# Patient Record
Sex: FEMALE | Race: WHITE | NOT HISPANIC OR LATINO | Employment: FULL TIME | URBAN - METROPOLITAN AREA
[De-identification: names, ages, dates, MRNs, and addresses within clinical notes are randomized per-mention and may not be internally consistent; named-entity substitution may affect disease eponyms.]

---

## 2017-07-26 ENCOUNTER — HOSPITAL ENCOUNTER (EMERGENCY)
Facility: HOSPITAL | Age: 52
Discharge: HOME | End: 2017-07-26
Attending: EMERGENCY MEDICINE | Admitting: EMERGENCY MEDICINE
Payer: COMMERCIAL

## 2017-07-26 ENCOUNTER — APPOINTMENT (EMERGENCY)
Dept: CT IMAGING | Facility: HOSPITAL | Age: 52
End: 2017-07-26
Payer: COMMERCIAL

## 2017-07-26 VITALS
HEART RATE: 76 BPM | SYSTOLIC BLOOD PRESSURE: 117 MMHG | WEIGHT: 158.51 LBS | DIASTOLIC BLOOD PRESSURE: 67 MMHG | RESPIRATION RATE: 16 BRPM | TEMPERATURE: 97.9 F | OXYGEN SATURATION: 99 %

## 2017-07-26 DIAGNOSIS — M54.40 LUMBAGO WITH SCIATICA: Primary | ICD-10-CM

## 2017-07-26 LAB
ANION GAP SERPL CALCULATED.3IONS-SCNC: 9 MMOL/L (ref 4–13)
BACTERIA UR QL AUTO: ABNORMAL /HPF
BASOPHILS # BLD AUTO: 0.02 THOUSANDS/ΜL (ref 0–0.1)
BASOPHILS NFR BLD AUTO: 0 % (ref 0–1)
BILIRUB UR QL STRIP: NEGATIVE
BUN SERPL-MCNC: 14 MG/DL (ref 5–25)
CALCIUM SERPL-MCNC: 8.8 MG/DL (ref 8.3–10.1)
CHLORIDE SERPL-SCNC: 106 MMOL/L (ref 100–108)
CLARITY UR: CLEAR
CLARITY, POC: CLEAR
CO2 SERPL-SCNC: 28 MMOL/L (ref 21–32)
COLOR UR: YELLOW
COLOR, POC: YELLOW
CREAT SERPL-MCNC: 0.64 MG/DL (ref 0.6–1.3)
EOSINOPHIL # BLD AUTO: 0.24 THOUSAND/ΜL (ref 0–0.61)
EOSINOPHIL NFR BLD AUTO: 3 % (ref 0–6)
ERYTHROCYTE [DISTWIDTH] IN BLOOD BY AUTOMATED COUNT: 13.3 % (ref 11.6–15.1)
EXT BILIRUBIN, UA: NORMAL
EXT BLOOD URINE: NORMAL
EXT GLUCOSE, UA: NEGATIVE
EXT KETONES: NEGATIVE
EXT NITRITE, UA: NEGATIVE
EXT PH, UA: 6.5
EXT PROTEIN, UA: NORMAL
EXT SPECIFIC GRAVITY, UA: NORMAL
EXT UROBILINOGEN: 0.2
GFR SERPL CREATININE-BSD FRML MDRD: 104 ML/MIN/1.73SQ M
GLUCOSE SERPL-MCNC: 107 MG/DL (ref 65–140)
GLUCOSE UR STRIP-MCNC: NEGATIVE MG/DL
HCT VFR BLD AUTO: 36.4 % (ref 34.8–46.1)
HGB BLD-MCNC: 12.2 G/DL (ref 11.5–15.4)
HGB UR QL STRIP.AUTO: ABNORMAL
KETONES UR STRIP-MCNC: NEGATIVE MG/DL
LEUKOCYTE ESTERASE UR QL STRIP: NEGATIVE
LYMPHOCYTES # BLD AUTO: 3.05 THOUSANDS/ΜL (ref 0.6–4.47)
LYMPHOCYTES NFR BLD AUTO: 33 % (ref 14–44)
MCH RBC QN AUTO: 27.9 PG (ref 26.8–34.3)
MCHC RBC AUTO-ENTMCNC: 33.5 G/DL (ref 31.4–37.4)
MCV RBC AUTO: 83 FL (ref 82–98)
MONOCYTES # BLD AUTO: 0.71 THOUSAND/ΜL (ref 0.17–1.22)
MONOCYTES NFR BLD AUTO: 8 % (ref 4–12)
NEUTROPHILS # BLD AUTO: 5.25 THOUSANDS/ΜL (ref 1.85–7.62)
NEUTS SEG NFR BLD AUTO: 56 % (ref 43–75)
NITRITE UR QL STRIP: NEGATIVE
NON-SQ EPI CELLS URNS QL MICRO: ABNORMAL /HPF
PH UR STRIP.AUTO: 6 [PH] (ref 4.5–8)
PLATELET # BLD AUTO: 261 THOUSANDS/UL (ref 149–390)
PMV BLD AUTO: 9.6 FL (ref 8.9–12.7)
POTASSIUM SERPL-SCNC: 3.7 MMOL/L (ref 3.5–5.3)
PROT UR STRIP-MCNC: NEGATIVE MG/DL
RBC # BLD AUTO: 4.37 MILLION/UL (ref 3.81–5.12)
RBC #/AREA URNS AUTO: ABNORMAL /HPF
SODIUM SERPL-SCNC: 143 MMOL/L (ref 136–145)
SP GR UR STRIP.AUTO: 1.01 (ref 1–1.03)
UROBILINOGEN UR QL STRIP.AUTO: 0.2 E.U./DL
WBC # BLD AUTO: 9.27 THOUSAND/UL (ref 4.31–10.16)
WBC # BLD EST: NEGATIVE 10*3/UL
WBC #/AREA URNS AUTO: ABNORMAL /HPF

## 2017-07-26 PROCEDURE — 96374 THER/PROPH/DIAG INJ IV PUSH: CPT

## 2017-07-26 PROCEDURE — 81001 URINALYSIS AUTO W/SCOPE: CPT | Performed by: EMERGENCY MEDICINE

## 2017-07-26 PROCEDURE — 81002 URINALYSIS NONAUTO W/O SCOPE: CPT | Performed by: EMERGENCY MEDICINE

## 2017-07-26 PROCEDURE — 36415 COLL VENOUS BLD VENIPUNCTURE: CPT | Performed by: EMERGENCY MEDICINE

## 2017-07-26 PROCEDURE — 80048 BASIC METABOLIC PNL TOTAL CA: CPT | Performed by: EMERGENCY MEDICINE

## 2017-07-26 PROCEDURE — 99284 EMERGENCY DEPT VISIT MOD MDM: CPT

## 2017-07-26 PROCEDURE — 74176 CT ABD & PELVIS W/O CONTRAST: CPT

## 2017-07-26 PROCEDURE — 85025 COMPLETE CBC W/AUTO DIFF WBC: CPT | Performed by: EMERGENCY MEDICINE

## 2017-07-26 RX ORDER — METHYLPREDNISOLONE 4 MG/1
TABLET ORAL
Qty: 21 TABLET | Refills: 0 | Status: SHIPPED | OUTPATIENT
Start: 2017-07-26 | End: 2018-11-27

## 2017-07-26 RX ORDER — MORPHINE SULFATE 15 MG/1
15 TABLET ORAL EVERY 4 HOURS PRN
Qty: 15 TABLET | Refills: 0 | Status: SHIPPED | OUTPATIENT
Start: 2017-07-26 | End: 2018-11-27

## 2017-07-26 RX ORDER — KETOROLAC TROMETHAMINE 30 MG/ML
15 INJECTION, SOLUTION INTRAMUSCULAR; INTRAVENOUS ONCE
Status: COMPLETED | OUTPATIENT
Start: 2017-07-26 | End: 2017-07-26

## 2017-07-26 RX ADMIN — KETOROLAC TROMETHAMINE 15 MG: 30 INJECTION, SOLUTION INTRAMUSCULAR at 15:48

## 2017-11-20 ENCOUNTER — ALLSCRIPTS OFFICE VISIT (OUTPATIENT)
Dept: OTHER | Facility: OTHER | Age: 52
End: 2017-11-20

## 2017-11-20 DIAGNOSIS — E01.0 IODINE-DEFICIENCY RELATED DIFFUSE GOITER: ICD-10-CM

## 2017-11-20 DIAGNOSIS — Z01.419 ENCOUNTER FOR GYNECOLOGICAL EXAMINATION WITHOUT ABNORMAL FINDING: ICD-10-CM

## 2017-11-21 NOTE — PROGRESS NOTES
Assessment  1  Encounter for annual routine gynecological examination (V72 31) (Z01 419)    Plan   Encounter for annual routine gynecological examination    · (B) PAP WITH HPV PLUS; Status:Active; Requested for:20Nov2017;    Perform:[facility]; Due:20Nov2018; Ordered;For:Encounter for annual routine gynecological examination; Ordered By:Preethi Dawson; Thyromegaly    · (1) T4, FREE; Status:Active; Requested for:20Nov2017;    Perform:[facility]; DRD:[id number]; Ordered; For:Thyromegaly; Ordered By:Preethi Dawson;   · (1) TSH; Status:Active; Requested for:20Nov2017;    Perform:[facility]; RMM:[id number]; Ordered;Ordered By:Preethi Dawson;  * MAMMO SCREENING BILATERAL W CAD; Status:Hold For - Scheduling; Requested for:20Nov2017;  Perform:[facility]; [phone]; Ordered;   For:Encounter for annual routine gynecological examination; Ordered By:Preethi Dawson;  Genetics Counselor Referral Co-Management  *  Status: Hold For - Scheduling  Requested for: [id number] Ordered; For: FamHx: Family history of breast cancer;  Ordered By: Tati Johnston  Performed:   Due: [id number]   Discussion/Summary  health maintenance visit healthy adult female Breast cancer screening: the risks and benefits of breast cancer screening were discussed and monthly self breast exam was advised  Pt for annual GYN exam Yinka-PCPplan TFTs due to mild thyromegalyquestions sister w breast cancer and if additional testing is needed based on that  Info given genetic counselor if pt desires discussion  The patient has the current Goals: At goal  The patent has the current Barriers: None  Patient is able to Self-Care  Chief Complaint  P for annual GYN exam      History of Present Illness  HPI: Pt for annual GYN exam  Over the last year periods still coming monthly but flow is lessening  Still coming monthly  Does c/o some night sweats but tolerable  Denies vaginal d/c or GYN complaints   Was recently on abx watching for yeas sx but nothing currently  NOt currenlty sexually active  WNL in the pasthave cyst aspirated left breast 6 years ago    GYN HM, Adult Female [facility]: The patient is being seen for a health maintenance and gynecology evaluation  The last health maintenance visit was 1 year(s) ago  General Health: The patient's health since the last visit is described as good  Lifestyle:  She does not use tobacco -- She denies alcohol use  -- She denies drug use  Reproductive health: the patient is perimenopausal--   she reports no menstrual problems  -- she uses no contraception  -- she is not sexually active  -- she denies prior pregnancies  Screening:      Review of Systems  no pelvic pain,-- no pelvic pressure,-- no vaginal pain,-- no vaginal discharge,-- no vaginal itching,-- no vaginal lump or mass,-- no vaginal odor,-- no nonmenstrual bleeding,-- no dysmenorrhea,-- no menorrhagia,-- periods are regular,-- regular length of periods,-- no dysuria,-- no bladder pain,-- urine not cloudy-- and-- no urinary loss of control  Constitutional: no fever-- and-- no chills  Cardiovascular: no chest pain-- and-- no palpitations  Respiratory: no shortness of breath-- and-- no wheezing  Gastrointestinal: no abdominal pain-- and-- no constipation  Integumentary: no rashes  Surgical History    The surgical history was reviewed and updated today  Family History  Sister    · Family history of breast cancer (V16 3) (Z80 3)   · Family history of diabetes mellitus (V18 0) (Z83 3)   · Family history of factor V deficiency (V18 3) (Z83 2)    Social History   · Feels safe at home   · No alcohol use   · No history of regular tobacco use (V49 89) (Z78 9)   · Primary spoken language English    Current Meds   1  No Reported Medications Recorded    Allergies  1   No Known Drug Allergies    Vitals   Recorded: [id number] 09:20IU   Systolic 268, LUE, Sitting   Diastolic 78, LUE, Sitting   Height 5 ft 1 in   Weight 166 lb BMI Calculated 31 37   BSA Calculated 1 75   LMP 16Oct2017       Physical Exam   Constitutional  General appearance: No acute distress, well appearing and well nourished  Neck  Neck: Normal, supple, trachea midline, no masses  Thyroid: Abnormal  -- mild diffuse thyromegaly  Pulmonary  Respiratory effort: No increased work of breathing or signs of respiratory distress  Auscultation of lungs: Clear to auscultation  Cardiovascular  Auscultation of heart: Normal rate and rhythm, normal S1 and S2, no murmurs  Peripheral vascular exam: Normal pulses Throughout  Genitourinary  External genitalia: Normal and no lesions appreciated  Vagina: Normal, no lesions or dryness appreciated  Urethra: Normal    Urethral meatus: Normal    Bladder: Normal, soft, non-tender and no prolapse or masses appreciated  Cervix: Normal, no palpable masses  -- did not fully visualize entire cx due to pt discomfort during exam   Uterus: Normal, non-tender, not enlarged, and no palpable masses  Adnexa/parametria: Normal, non-tender and no fullness or masses appreciated  Anus, perineum, and rectum: Normal sphincter tone, no masses, and no prolapse  Chest  Breasts: Normal and no dimpling or skin changes noted  Abdomen  Abdomen: Normal, non-tender, and no organomegaly noted  Liver and spleen: No hepatomegaly or splenomegaly  Examination for hernias: No hernias appreciated  Stool sample for occult blood: Negative  -- heme neg  Lymphatic  Palpation of lymph nodes in neck, axillae, groin and/or other locations: No lymphadenopathy or masses noted  Skin  Skin and subcutaneous tissue: Normal skin turgor and no rashes  Palpation of skin and subcutaneous tissue: Normal    Psychiatric  Orientation to person, place, and time: Normal    Mood and affect: Normal        Attending Note  Collaborating Physician Note: Collaborating Note: I supervised the Advanced Practitioner-- and-- I agree with the Advanced Practitioner note   I discussed the case with the Advanced Practitioner and reviewed the AP note      Signatures   Electronically signed by : Ritchie oCrona, [facility]; Nov 20 2017  9:32AM EST                       (Author)    Electronically signed by : DMITRIY Javier ; Nov 20 2017  9:26PM EST                       (Author)

## 2017-11-23 ENCOUNTER — LAB CONVERSION - ENCOUNTER (OUTPATIENT)
Dept: OTHER | Facility: OTHER | Age: 52
End: 2017-11-23

## 2017-11-23 LAB
T4 FREE SERPL-MCNC: 0.9 NG/DL (ref 0.8–1.8)
TSH SERPL DL<=0.05 MIU/L-ACNC: 2.11 MIU/L

## 2017-11-26 LAB
HPV 18 (HISTORICAL): NOT DETECTED
HPV HIGH RISK 16/18 (HISTORICAL): NOT DETECTED
HPV16 (HISTORICAL): NOT DETECTED
PAP (HISTORICAL): NORMAL

## 2017-11-28 ENCOUNTER — GENERIC CONVERSION - ENCOUNTER (OUTPATIENT)
Dept: OTHER | Facility: OTHER | Age: 52
End: 2017-11-28

## 2018-01-15 VITALS
HEIGHT: 61 IN | BODY MASS INDEX: 31.34 KG/M2 | DIASTOLIC BLOOD PRESSURE: 78 MMHG | SYSTOLIC BLOOD PRESSURE: 118 MMHG | WEIGHT: 166 LBS

## 2018-11-26 PROBLEM — Z01.419 WELL WOMAN EXAM: Status: ACTIVE | Noted: 2018-11-26

## 2018-11-26 PROBLEM — E78.5 HYPERLIPEMIA: Status: ACTIVE | Noted: 2017-11-20

## 2018-11-26 PROBLEM — E01.0 THYROMEGALY: Status: ACTIVE | Noted: 2017-11-20

## 2018-11-27 ENCOUNTER — ANNUAL EXAM (OUTPATIENT)
Dept: OBGYN CLINIC | Facility: CLINIC | Age: 53
End: 2018-11-27
Payer: COMMERCIAL

## 2018-11-27 VITALS
SYSTOLIC BLOOD PRESSURE: 118 MMHG | DIASTOLIC BLOOD PRESSURE: 80 MMHG | HEIGHT: 61 IN | WEIGHT: 175 LBS | BODY MASS INDEX: 33.04 KG/M2

## 2018-11-27 DIAGNOSIS — Z01.419 WELL WOMAN EXAM: Primary | ICD-10-CM

## 2018-11-27 DIAGNOSIS — Z12.39 BREAST CANCER SCREENING: ICD-10-CM

## 2018-11-27 PROCEDURE — 99396 PREV VISIT EST AGE 40-64: CPT | Performed by: PHYSICIAN ASSISTANT

## 2018-11-27 NOTE — PATIENT INSTRUCTIONS
Pt doing well  Pap deferred today due to guidelines  Will plan mammo and colonoscopy this year  F/u 1 year, earlier as needed

## 2019-09-05 ENCOUNTER — APPOINTMENT (EMERGENCY)
Dept: RADIOLOGY | Facility: HOSPITAL | Age: 54
End: 2019-09-05
Payer: COMMERCIAL

## 2019-09-05 ENCOUNTER — HOSPITAL ENCOUNTER (EMERGENCY)
Facility: HOSPITAL | Age: 54
Discharge: HOME | End: 2019-09-05
Attending: EMERGENCY MEDICINE | Admitting: EMERGENCY MEDICINE
Payer: COMMERCIAL

## 2019-09-05 VITALS
SYSTOLIC BLOOD PRESSURE: 137 MMHG | WEIGHT: 172.4 LBS | DIASTOLIC BLOOD PRESSURE: 72 MMHG | BODY MASS INDEX: 32.57 KG/M2 | TEMPERATURE: 98 F | OXYGEN SATURATION: 97 % | HEART RATE: 75 BPM | RESPIRATION RATE: 18 BRPM

## 2019-09-05 DIAGNOSIS — T50.905A ADVERSE EFFECT OF DRUG, INITIAL ENCOUNTER: ICD-10-CM

## 2019-09-05 DIAGNOSIS — R06.00 DYSPNEA: Primary | ICD-10-CM

## 2019-09-05 LAB
ALBUMIN SERPL BCP-MCNC: 3.8 G/DL (ref 3.5–5)
ALP SERPL-CCNC: 74 U/L (ref 46–116)
ALT SERPL W P-5'-P-CCNC: 19 U/L (ref 12–78)
ANION GAP SERPL CALCULATED.3IONS-SCNC: 11 MMOL/L (ref 4–13)
APTT PPP: 30 SECONDS (ref 23–37)
AST SERPL W P-5'-P-CCNC: 27 U/L (ref 5–45)
ATRIAL RATE: 67 BPM
ATRIAL RATE: 69 BPM
B-HCG SERPL-ACNC: <2 MIU/ML
BASOPHILS # BLD AUTO: 0.07 THOUSANDS/ΜL (ref 0–0.1)
BASOPHILS NFR BLD AUTO: 1 % (ref 0–1)
BILIRUB SERPL-MCNC: 0.3 MG/DL (ref 0.2–1)
BUN SERPL-MCNC: 11 MG/DL (ref 5–25)
CALCIUM SERPL-MCNC: 9.6 MG/DL (ref 8.3–10.1)
CHLORIDE SERPL-SCNC: 102 MMOL/L (ref 100–108)
CO2 SERPL-SCNC: 26 MMOL/L (ref 21–32)
CREAT SERPL-MCNC: 0.79 MG/DL (ref 0.6–1.3)
DEPRECATED D DIMER PPP: 463 NG/ML (FEU)
EOSINOPHIL # BLD AUTO: 0.54 THOUSAND/ΜL (ref 0–0.61)
EOSINOPHIL NFR BLD AUTO: 6 % (ref 0–6)
ERYTHROCYTE [DISTWIDTH] IN BLOOD BY AUTOMATED COUNT: 13.2 % (ref 11.6–15.1)
EXT PREG TEST URINE: NEGATIVE
EXT. CONTROL ED NAV: NORMAL
GFR SERPL CREATININE-BSD FRML MDRD: 86 ML/MIN/1.73SQ M
GLUCOSE SERPL-MCNC: 89 MG/DL (ref 65–140)
HCT VFR BLD AUTO: 40.6 % (ref 34.8–46.1)
HGB BLD-MCNC: 13.4 G/DL (ref 11.5–15.4)
IMM GRANULOCYTES # BLD AUTO: 0.03 THOUSAND/UL (ref 0–0.2)
IMM GRANULOCYTES NFR BLD AUTO: 0 % (ref 0–2)
INR PPP: 0.96 (ref 0.84–1.19)
LACTATE SERPL-SCNC: 1.4 MMOL/L (ref 0.5–2)
LYMPHOCYTES # BLD AUTO: 3.68 THOUSANDS/ΜL (ref 0.6–4.47)
LYMPHOCYTES NFR BLD AUTO: 39 % (ref 14–44)
MCH RBC QN AUTO: 27.3 PG (ref 26.8–34.3)
MCHC RBC AUTO-ENTMCNC: 33 G/DL (ref 31.4–37.4)
MCV RBC AUTO: 83 FL (ref 82–98)
MONOCYTES # BLD AUTO: 0.83 THOUSAND/ΜL (ref 0.17–1.22)
MONOCYTES NFR BLD AUTO: 9 % (ref 4–12)
NEUTROPHILS # BLD AUTO: 4.32 THOUSANDS/ΜL (ref 1.85–7.62)
NEUTS SEG NFR BLD AUTO: 45 % (ref 43–75)
NRBC BLD AUTO-RTO: 0 /100 WBCS
NT-PROBNP SERPL-MCNC: 10 PG/ML
P AXIS: 50 DEGREES
P AXIS: 55 DEGREES
PLATELET # BLD AUTO: 324 THOUSANDS/UL (ref 149–390)
PMV BLD AUTO: 9.5 FL (ref 8.9–12.7)
POTASSIUM SERPL-SCNC: 3.5 MMOL/L (ref 3.5–5.3)
PR INTERVAL: 152 MS
PR INTERVAL: 154 MS
PROT SERPL-MCNC: 7.8 G/DL (ref 6.4–8.2)
PROTHROMBIN TIME: 12.2 SECONDS (ref 11.6–14.5)
QRS AXIS: 70 DEGREES
QRS AXIS: 71 DEGREES
QRSD INTERVAL: 84 MS
QRSD INTERVAL: 90 MS
QT INTERVAL: 380 MS
QT INTERVAL: 390 MS
QTC INTERVAL: 407 MS
QTC INTERVAL: 412 MS
RBC # BLD AUTO: 4.91 MILLION/UL (ref 3.81–5.12)
SODIUM SERPL-SCNC: 139 MMOL/L (ref 136–145)
T WAVE AXIS: 35 DEGREES
T WAVE AXIS: 48 DEGREES
TROPONIN I SERPL-MCNC: 0.04 NG/ML
TROPONIN I SERPL-MCNC: <0.02 NG/ML
VENTRICULAR RATE: 67 BPM
VENTRICULAR RATE: 69 BPM
WBC # BLD AUTO: 9.47 THOUSAND/UL (ref 4.31–10.16)

## 2019-09-05 PROCEDURE — 87040 BLOOD CULTURE FOR BACTERIA: CPT | Performed by: EMERGENCY MEDICINE

## 2019-09-05 PROCEDURE — 83880 ASSAY OF NATRIURETIC PEPTIDE: CPT | Performed by: EMERGENCY MEDICINE

## 2019-09-05 PROCEDURE — 85025 COMPLETE CBC W/AUTO DIFF WBC: CPT | Performed by: EMERGENCY MEDICINE

## 2019-09-05 PROCEDURE — 99285 EMERGENCY DEPT VISIT HI MDM: CPT | Performed by: EMERGENCY MEDICINE

## 2019-09-05 PROCEDURE — 85379 FIBRIN DEGRADATION QUANT: CPT | Performed by: EMERGENCY MEDICINE

## 2019-09-05 PROCEDURE — 84702 CHORIONIC GONADOTROPIN TEST: CPT | Performed by: EMERGENCY MEDICINE

## 2019-09-05 PROCEDURE — 94640 AIRWAY INHALATION TREATMENT: CPT

## 2019-09-05 PROCEDURE — 93005 ELECTROCARDIOGRAM TRACING: CPT

## 2019-09-05 PROCEDURE — 71046 X-RAY EXAM CHEST 2 VIEWS: CPT

## 2019-09-05 PROCEDURE — 85610 PROTHROMBIN TIME: CPT | Performed by: EMERGENCY MEDICINE

## 2019-09-05 PROCEDURE — 93010 ELECTROCARDIOGRAM REPORT: CPT | Performed by: INTERNAL MEDICINE

## 2019-09-05 PROCEDURE — 99284 EMERGENCY DEPT VISIT MOD MDM: CPT

## 2019-09-05 PROCEDURE — 85730 THROMBOPLASTIN TIME PARTIAL: CPT | Performed by: EMERGENCY MEDICINE

## 2019-09-05 PROCEDURE — 83605 ASSAY OF LACTIC ACID: CPT | Performed by: EMERGENCY MEDICINE

## 2019-09-05 PROCEDURE — 81025 URINE PREGNANCY TEST: CPT | Performed by: EMERGENCY MEDICINE

## 2019-09-05 PROCEDURE — 80053 COMPREHEN METABOLIC PANEL: CPT | Performed by: EMERGENCY MEDICINE

## 2019-09-05 PROCEDURE — 84484 ASSAY OF TROPONIN QUANT: CPT | Performed by: EMERGENCY MEDICINE

## 2019-09-05 PROCEDURE — 36415 COLL VENOUS BLD VENIPUNCTURE: CPT | Performed by: EMERGENCY MEDICINE

## 2019-09-05 RX ORDER — ALBUTEROL SULFATE 2.5 MG/3ML
2.5 SOLUTION RESPIRATORY (INHALATION) ONCE
Status: COMPLETED | OUTPATIENT
Start: 2019-09-05 | End: 2019-09-05

## 2019-09-05 RX ORDER — ALBUTEROL SULFATE 90 UG/1
2 AEROSOL, METERED RESPIRATORY (INHALATION) ONCE
Status: DISCONTINUED | OUTPATIENT
Start: 2019-09-05 | End: 2019-09-05 | Stop reason: HOSPADM

## 2019-09-05 RX ADMIN — ALBUTEROL SULFATE 2.5 MG: 2.5 SOLUTION RESPIRATORY (INHALATION) at 03:57

## 2019-09-05 NOTE — ED PROVIDER NOTES
History  Chief Complaint   Patient presents with    Allergic Reaction     woke at [phone] with sob, dizziness and shakey  started taking clotrimazole-bethamethsone cream yesterday     Patient is a 48year old female with sob tonight and cough and dizziness and shakiness  No travel  No chest pain  No fever  Just started clotrimazole-bethamethasone cream yesterday for tinea corporis of R forearm  States she drove here  ?throat swelling  No N/V  LMP - 3 weeks ago  Was last seen in this ED on 7/26/17 for lumbago with sciatica  [facility] on this patient and no Rx found  History provided by:  Patient   used: No    Allergic Reaction   Presenting symptoms: difficulty swallowing (possible) and rash        None       History reviewed  No pertinent past medical history  History reviewed  No pertinent surgical history  Family History   Problem Relation Age of Onset    Breast cancer Sister     Factor V Leiden deficiency Sister     Diabetes Sister      I have reviewed and agree with the history as documented  Social History     Tobacco Use    Smoking status: Never Smoker    Smokeless tobacco: Never Used   Substance Use Topics    Alcohol use: Not on file    Drug use: No        Review of Systems   Constitutional: Negative for fever  HENT: Positive for trouble swallowing (possible)  Respiratory: Positive for cough and shortness of breath  Cardiovascular: Negative for chest pain  Gastrointestinal: Negative for nausea and vomiting  Skin: Positive for rash  Neurological: Positive for dizziness  All other systems reviewed and are negative  Physical Exam  Physical Exam   Constitutional: She is oriented to person, place, and time  She appears well-developed and well-nourished  She appears distressed (moderate)  HENT:   Head: Normocephalic and atraumatic  Mouth/Throat: Oropharynx is clear and moist  No oropharyngeal exudate     Eyes: Pupils are equal, round, and reactive to light  EOM are normal  No scleral icterus  Neck: Normal range of motion  Neck supple  No JVD present  No tracheal deviation present  Cardiovascular: Normal rate, regular rhythm and normal heart sounds  No murmur heard  Pulmonary/Chest: Effort normal and breath sounds normal  No stridor  No respiratory distress  She has no wheezes  She has no rales  She exhibits no tenderness  Abdominal: Soft  Bowel sounds are normal  There is no tenderness  Musculoskeletal: She exhibits no edema or deformity  Neurological: She is alert and oriented to person, place, and time  No focal deficits  Skin: Skin is warm and dry  No rash noted  Psychiatric: She has a normal mood and affect  Nursing note and vitals reviewed        Vital Signs  ED Triage Vitals [09/05/19 0310]   Temperature Pulse Respirations Blood Pressure SpO2   98 °F (36 7 °C) 78 18 143/85 98 %      Temp Source Heart Rate Source Patient Position - Orthostatic VS BP Location FiO2 (%)   Oral Monitor Lying Right arm --      Pain Score       No Pain           Vitals:    09/05/19 0310 09/05/19 0621   BP: 143/85 137/72   Pulse: 78 75   Patient Position - Orthostatic VS: Lying Lying         Visual Acuity      ED Medications  Medications   albuterol (PROVENTIL HFA,VENTOLIN HFA) inhaler 2 puff (has no administration in time range)   albuterol inhalation solution 2 5 mg (2 5 mg Nebulization Given 9/5/19 0357)       Diagnostic Studies  Results Reviewed     Procedure Component Value Units Date/Time    Troponin I [[id number]]  (Normal) Collected:  09/05/19 0627    Lab Status:  Final result Specimen:  Blood from Arm, Right Updated:  09/05/19 0648     Troponin I <0 02 ng/mL     Quantitative hCG [[id number]]  (Normal) Collected:  09/05/19 0341    Lab Status:  Final result Specimen:  Blood from Arm, Right Updated:  09/05/19 0425     HCG, Quant <2 mIU/mL     Narrative:        Expected Ranges:     Approximate               Approximate HCG  Gestation age Concentration ( mIU/mL)  _____________          ______________________   Jason Agudelo                      HCG values  0 2-1                       5-50  1-2                           2-3                         100-5000  3-4                         500-32214  4-5                         1000-82112  5-6                         45713-303909  6-8                         48830-071641  8-12                        97446-510900      POCT pregnancy, urine [[id number]]  (Normal) Resulted:  09/05/19 0420    Lab Status:  Final result Updated:  09/05/19 0420     EXT PREG TEST UR (Ref: Negative) negative     Control valid    B-type natriuretic peptide [[id number]]  (Normal) Collected:  09/05/19 0341    Lab Status:  Final result Specimen:  Blood from Arm, Right Updated:  09/05/19 0420     NT-proBNP 10 pg/mL     Blood culture #1 [[id number]] Collected:  09/05/19 0404    Lab Status: In process Specimen:  Blood from Arm, Right Updated:  09/05/19 0412    Lactic acid, plasma [[id number]]  (Normal) Collected:  09/05/19 0341    Lab Status:  Final result Specimen:  Blood from Arm, Right Updated:  09/05/19 0409     LACTIC ACID 1 4 mmol/L     Narrative:       Result may be elevated if tourniquet was used during collection      Troponin I [[id number]]  (Normal) Collected:  09/05/19 0341    Lab Status:  Final result Specimen:  Blood from Arm, Right Updated:  09/05/19 0406     Troponin I 0 04 ng/mL     Comprehensive metabolic panel [[id number]] Collected:  09/05/19 0341    Lab Status:  Final result Specimen:  Blood from Arm, Right Updated:  09/05/19 0405     Sodium 139 mmol/L      Potassium 3 5 mmol/L      Chloride 102 mmol/L      CO2 26 mmol/L      ANION GAP 11 mmol/L      BUN 11 mg/dL      Creatinine 0 79 mg/dL      Glucose 89 mg/dL      Calcium 9 6 mg/dL      AST 27 U/L      ALT 19 U/L      Alkaline Phosphatase 74 U/L      Total Protein 7 8 g/dL      Albumin 3 8 g/dL      Total Bilirubin 0 30 mg/dL      eGFR 86 ml/min/1 73sq m     Narrative: National Kidney Disease Foundation guidelines for Chronic Kidney Disease (CKD):     Stage 1 with normal or high GFR (GFR > 90 mL/min/1 73 square meters)    Stage 2 Mild CKD (GFR = 60-89 mL/min/1 73 square meters)    Stage 3A Moderate CKD (GFR = 45-59 mL/min/1 73 square meters)    Stage 3B Moderate CKD (GFR = 30-44 mL/min/1 73 square meters)    Stage 4 Severe CKD (GFR = 15-29 mL/min/1 73 square meters)    Stage 5 End Stage CKD (GFR <15 mL/min/1 73 square meters)  Note: GFR calculation is accurate only with a steady state creatinine    D-Dimer [[id number]]  (Normal) Collected:  09/05/19 0341    Lab Status:  Final result Specimen:  Blood from Arm, Right Updated:  09/05/19 0402     D-Dimer, Quant 463 ng/ml (FEU)     Protime-INR [[id number]]  (Normal) Collected:  09/05/19 0341    Lab Status:  Final result Specimen:  Blood from Arm, Right Updated:  09/05/19 0359     Protime 12 2 seconds      INR 0 96    APTT [[id number]]  (Normal) Collected:  09/05/19 0341    Lab Status:  Final result Specimen:  Blood from Arm, Right Updated:  09/05/19 0359     PTT 30 seconds     CBC and differential [[id number]] Collected:  09/05/19 0341    Lab Status:  Final result Specimen:  Blood from Arm, Right Updated:  09/05/19 0351     WBC 9 47 Thousand/uL      RBC 4 91 Million/uL      Hemoglobin 13 4 g/dL      Hematocrit 40 6 %      MCV 83 fL      MCH 27 3 pg      MCHC 33 0 g/dL      RDW 13 2 %      MPV 9 5 fL      Platelets 499 Thousands/uL      nRBC 0 /100 WBCs      Neutrophils Relative 45 %      Immat GRANS % 0 %      Lymphocytes Relative 39 %      Monocytes Relative 9 %      Eosinophils Relative 6 %      Basophils Relative 1 %      Neutrophils Absolute 4 32 Thousands/µL      Immature Grans Absolute 0 03 Thousand/uL      Lymphocytes Absolute 3 68 Thousands/µL      Monocytes Absolute 0 83 Thousand/µL      Eosinophils Absolute 0 54 Thousand/µL      Basophils Absolute 0 07 Thousands/µL     Blood culture #2 [[id number]] Collected:  09/05/19 0341 Lab Status: In process Specimen:  Blood from Arm, Right Updated:  09/05/19 0344                 XR chest 2 views   ED Interpretation by Tino Fernandez MD (09/05 0405)   No acute disease read by me  Procedures  ECG 12 Lead Documentation Only  Date/Time: 9/5/2019 3:35 AM  Performed by: Tino Fernandez MD  Authorized by: Tino Fernandez MD     Indications / Diagnosis:  Sob  ECG reviewed by me, the ED Provider: yes    Patient location:  ED  Previous ECG:     Previous ECG:  Unavailable  Interpretation:     Interpretation: normal    Rate:     ECG rate:  69    ECG rate assessment: normal    Rhythm:     Rhythm: sinus rhythm    Ectopy:     Ectopy: none    QRS:     QRS axis:  Normal    QRS intervals:  Normal  Conduction:     Conduction: normal    ST segments:     ST segments:  Normal  T waves:     T waves: normal      ECG 12 Lead Documentation Only  Date/Time: 9/5/2019 6:26 AM  Performed by: Tino Fernandez MD  Authorized by: Tino Fenrandez MD     Indications / Diagnosis:  Repeat ekg for sob  ECG reviewed by me, the ED Provider: yes    Patient location:  ED  Previous ECG:     Previous ECG:  Compared to current    Comparison ECG info:  Earlier tonight    Similarity:  No change  Interpretation:     Interpretation: normal    Rate:     ECG rate:  67    ECG rate assessment: normal    Rhythm:     Rhythm: sinus rhythm    Ectopy:     Ectopy: none    QRS:     QRS axis:  Normal    QRS intervals:  Normal  Conduction:     Conduction: normal    ST segments:     ST segments:  Normal  T waves:     T waves: normal             ED Course  ED Course as of Sep 05 0659   Thu Sep 05, 2019   0335 EKG d/w patient        56 Labs and CXR d/w patient  Will recheck EKG and  troponin at 0630 and if okay will discharge home  6675 Repeat EKG and troponin d/w patient                                     MDM  Number of Diagnoses or Management Options  Diagnosis management comments: DDX including but not limited to: pneumonia, pleural effusion, CHF, PE, PTX, ACS, MI, asthma exacerbation, COPD exacerbation, anemia, metabolic abnormality, renal failure, adverse reaction; doubt intracranial process  Amount and/or Complexity of Data Reviewed  Clinical lab tests: ordered and reviewed  Tests in the radiology section of CPT®: ordered and reviewed  Decide to obtain previous medical records or to obtain history from someone other than the patient: yes  Review and summarize past medical records: yes  Independent visualization of images, tracings, or specimens: yes        Disposition  Final diagnoses:   Dyspnea   Adverse effect of drug, initial encounter     Time reflects when diagnosis was documented in both MDM as applicable and the Disposition within this note     Time User Action Codes Description Comment    9/5/2019  6:56 AM Suzanna Radish Add [R06 00] Dyspnea     9/5/2019  6:57 AM Suzanna Radish Add [T50 905A] Adverse effect of drug, initial encounter       ED Disposition     ED Disposition Condition Date/Time Comment    Discharge Stable u Sep 5, 2019  6:56 AM Nessa Rucker discharge to home/self care  Follow-up Information     Follow up With Specialties Details Why Contact Info    Koki Corley MD Internal Medicine Call today Stop topical cream  Return sooner if increased difficulty breathing, [address]            Patient's Medications    No medications on file     No discharge procedures on file      ED Provider  Electronically Signed by           Lamont Batista MD  09/05/19 7301

## 2019-09-10 LAB
BACTERIA BLD CULT: NORMAL
BACTERIA BLD CULT: NORMAL

## 2019-12-12 DIAGNOSIS — Z12.39 SCREENING FOR BREAST CANCER: Primary | ICD-10-CM

## 2020-02-13 ENCOUNTER — OFFICE VISIT (OUTPATIENT)
Dept: OBGYN CLINIC | Facility: CLINIC | Age: 55
End: 2020-02-13
Payer: COMMERCIAL

## 2020-02-13 ENCOUNTER — APPOINTMENT (OUTPATIENT)
Dept: RADIOLOGY | Facility: AMBULARY SURGERY CENTER | Age: 55
End: 2020-02-13
Attending: SURGERY
Payer: COMMERCIAL

## 2020-02-13 VITALS
WEIGHT: 166 LBS | DIASTOLIC BLOOD PRESSURE: 77 MMHG | HEART RATE: 77 BPM | SYSTOLIC BLOOD PRESSURE: 126 MMHG | HEIGHT: 61 IN | BODY MASS INDEX: 31.34 KG/M2

## 2020-02-13 DIAGNOSIS — R22.31 MASS OF RIGHT HAND: ICD-10-CM

## 2020-02-13 DIAGNOSIS — R20.2 NUMBNESS AND TINGLING IN RIGHT HAND: ICD-10-CM

## 2020-02-13 DIAGNOSIS — R22.31 MASS OF RIGHT HAND: Primary | ICD-10-CM

## 2020-02-13 DIAGNOSIS — R20.0 NUMBNESS AND TINGLING IN RIGHT HAND: ICD-10-CM

## 2020-02-13 PROCEDURE — 99203 OFFICE O/P NEW LOW 30 MIN: CPT | Performed by: SURGERY

## 2020-02-13 PROCEDURE — 73130 X-RAY EXAM OF HAND: CPT

## 2020-02-13 RX ORDER — METHYLPREDNISOLONE 4 MG/1
TABLET ORAL
Qty: 1 EACH | Refills: 0 | Status: SHIPPED | OUTPATIENT
Start: 2020-02-13 | End: 2020-02-20

## 2020-02-13 NOTE — PROGRESS NOTES
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  It was discussed today that the mass could also be a giant cell tumor of the tendon sheath  With regards to her numbness and tingling, she may have irritated her ulnar nerve at the wrist(Guyon's canal)  A Medrol dosepak was prescribed today and sent to her pharmacy electronically  She was advised to avoid NSAID's while on the oral steroid  OT was ordered for ulnar nerve glides  In addition in regards to her volar small ring finger mass will send her to interventional Radiology to obtain an ultrasound guided aspiration if possible and then see her back approximately 2 weeks after intervention  Should the mass not be able to be aspirated or return after aspiration will proceed with surgical excision    The patient verbalized understanding of exam findings and treatment plan  We engaged in the shared decision-making process and treatment options were discussed at length with the patient  Surgical and conservative management discussed today along with risks and benefits  Diagnoses and all orders for this visit:    Mass of right hand  -     XR hand 3+ vw right; Future  -     US guided msk procedure; Future    Numbness and tingling in right hand  -     Ambulatory referral to PT/OT hand therapy; Future    Other orders  -     ibuprofen (MOTRIN) 100 mg/5 mL suspension; Take 200 mg by mouth every 4 (four) hours as needed for mild pain        Follow Up:  No follow-ups on file      To Do Next Visit:  Re-evaluation of current issue        ____________________________________________________________________________________________________________________________________________      CHIEF COMPLAINT:  Chief Complaint   Patient presents with    Right Hand - Pain       SUBJECTIVE:  Gil Jenkins is a 48y o  year old RHD female who presents to the office today for a right ring finger mass  Simi Veronica states that a few months ago, she noticed a mass to the palmar aspect of her right ring finger  She states that the mass has grown in size  She also notes that she is now experiencing numbness and tingling to her ulnar 2 digits as well as pain radiating to her elbow  She is taking Motrin as needed for pain control  Pain/symptom timing:  Worse during the day when active  Pain/symptom context:  Worse with activites and work  Pain/symptom modifying factors:  Rest makes better, activities make worse  Pain/symptom associated signs/symptoms: none    Prior treatment   · NSAIDsYes   · Injections No   · Bracing/Orthotics No    Physical Therapy No     I have personally reviewed all the relevant PMH, PSH, SH, FH, Medications and allergies      PAST MEDICAL HISTORY:  History reviewed  No pertinent past medical history  PAST SURGICAL HISTORY:  History reviewed  No pertinent surgical history  FAMILY HISTORY:  Family History   Problem Relation Age of Onset    Breast cancer Sister     Factor V Leiden deficiency Sister     Diabetes Sister        SOCIAL HISTORY:  Social History     Tobacco Use    Smoking status: Never Smoker    Smokeless tobacco: Never Used   Substance Use Topics    Alcohol use: Not on file    Drug use: No       MEDICATIONS:    Current Outpatient Medications:     ibuprofen (MOTRIN) 100 mg/5 mL suspension, Take 200 mg by mouth every 4 (four) hours as needed for mild pain, Disp: , Rfl:     ALLERGIES:  No Known Allergies        REVIEW OF SYSTEMS:  Review of Systems   Constitutional: Negative for chills, fever and unexpected weight change  HENT: Negative for hearing loss, nosebleeds and sore throat  Eyes: Negative for pain, redness and visual disturbance  Respiratory: Negative for cough, shortness of breath and wheezing      Cardiovascular: Negative for chest pain, palpitations and leg swelling  Gastrointestinal: Negative for abdominal pain, nausea and vomiting  Endocrine: Negative for polydipsia and polyuria  Genitourinary: Negative for difficulty urinating and hematuria  Musculoskeletal: Negative for arthralgias, joint swelling and myalgias  Skin: Negative for rash and wound  Neurological: Negative for dizziness, numbness and headaches  Psychiatric/Behavioral: Negative for decreased concentration, dysphoric mood and suicidal ideas  The patient is not nervous/anxious  VITALS:  Vitals:    02/13/20 0820   BP: 126/77   Pulse: 77       LABS:  HgA1c: No results found for: HGBA1C  BMP:   Lab Results   Component Value Date    CALCIUM 9 6 09/05/2019    K 3 5 09/05/2019    CO2 26 09/05/2019     09/05/2019    BUN 11 09/05/2019    CREATININE 0 79 09/05/2019       _____________________________________________________  PHYSICAL EXAMINATION:  General: well developed and well nourished, alert, oriented times 3 and appears comfortable  Psychiatric: Normal  HEENT: Normocephalic, Atraumatic Trachea Midline, No torticollis  Pulmonary: No audible wheezing or respiratory distress   Cardiovascular: No pitting edema, 2+ radial pulse   Skin: No erythema, lacerations, fluctation, ulcerations  Neurovascular: Sensation Intact to the Median, Ulnar, Radial Nerve, Motor Intact to the Median, Ulnar, Radial Nerve and Pulses Intact  Musculoskeletal: Normal, except as noted in detailed exam and in HPI  MUSCULOSKELETAL EXAMINATION:    Right ring finger:   No erythema  No ecchymosis   No edema  Palmar mass, proximal phalanx crease     Right Ulnar Nerve Exam:  Negative intrinsic atrophy  Negative  deformity at the elbow  Full range of motion with flexion and extension of the elbow  Negative ulnar nerve compression test at the elbow  Negative tinels over the ulnar nerve at the elbow  Right Guyon's canal Exam:  Negative thenar atrophy   Negative phalen's test  Negative Guyon's compression  Positive tinels over Guyon's canal at the wrist   Opposition strength 5/5    Abduction strength 5/5       ___________________________________________________  STUDIES REVIEWED:  I have personally reviewed AP lateral and oblique radiographs of right hand which demonstrates no acute fracture dislocation mild degenerative changes throughout          PROCEDURES PERFORMED:  Procedures  No Procedures performed today    _____________________________________________________      Félix Martinez    I,:   Shanthi Toney am acting as a scribe while in the presence of the attending physician :        I,:   Adilia Hammer MD personally performed the services described in this documentation    as scribed in my presence :

## 2020-02-20 ENCOUNTER — ANNUAL EXAM (OUTPATIENT)
Dept: OBGYN CLINIC | Facility: CLINIC | Age: 55
End: 2020-02-20
Payer: COMMERCIAL

## 2020-02-20 ENCOUNTER — EVALUATION (OUTPATIENT)
Dept: OCCUPATIONAL THERAPY | Age: 55
End: 2020-02-20
Payer: COMMERCIAL

## 2020-02-20 VITALS
BODY MASS INDEX: 31.91 KG/M2 | DIASTOLIC BLOOD PRESSURE: 80 MMHG | WEIGHT: 169 LBS | SYSTOLIC BLOOD PRESSURE: 120 MMHG | HEIGHT: 61 IN

## 2020-02-20 DIAGNOSIS — Z01.419 GYNECOLOGIC EXAM NORMAL: Primary | ICD-10-CM

## 2020-02-20 DIAGNOSIS — Z12.31 ENCOUNTER FOR SCREENING MAMMOGRAM FOR MALIGNANT NEOPLASM OF BREAST: ICD-10-CM

## 2020-02-20 DIAGNOSIS — R20.0 NUMBNESS: Primary | ICD-10-CM

## 2020-02-20 PROBLEM — B35.4 TINEA CORPORIS: Status: ACTIVE | Noted: 2019-09-04

## 2020-02-20 PROCEDURE — 99396 PREV VISIT EST AGE 40-64: CPT | Performed by: PHYSICIAN ASSISTANT

## 2020-02-20 PROCEDURE — 97165 OT EVAL LOW COMPLEX 30 MIN: CPT

## 2020-02-20 NOTE — PROGRESS NOTES
OT Evaluation     Today's date: 2020  Patient name: Terrance Garcia  : 1965  MRN: [id number]  Referring provider: Celi Serrano MD  Dx:   Encounter Diagnosis     ICD-10-CM    1  Numbness R20 0                   Assessment  Assessment details: Patient presents with numbness in her right ring/small fingers  Clinical testing suggests cubital tunnel syndrome  Her sensation was intact bilaterally  Pinch strength was slightly decreased  Patient mentioned having some ergonomic issues at work  We will problem solve to help with this  I also recommend a night time elbow extension orthosis  Impairments: lacks appropriate home exercise program  Other impairment: parathesias in right ring/small fingers  Understanding of Dx/Px/POC: good   Prognosis: good    Goals  STGs 1-2visits 1) instruct in nerve gliding exercises 2) fabricate elbow orthosis 3) ergonomic education LTGs 2-4wks 1) decrease parathesias right hand  2) good management of symptoms    Plan  Patient would benefit from: skilled occupational therapy  Planned modality interventions: thermotherapy: hydrocollator packs  Planned therapy interventions: manual therapy, activity modification, behavior modification, patient education, orthotic fitting/training, stretching, therapeutic exercise and therapeutic activities  Frequency: 2x week  Duration in weeks: 4  Treatment plan discussed with: patient        Subjective Evaluation    History of Present Illness  Mechanism of injury: Onset of numbness in her right small /ring fingers approximately 2 wks ago  She states it is constant but varies in severity  She also has a cyst in the ring finger which she is going to have removed next week  Patient states that an x ray 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   She occasionally has wrist pain     Pain  Current pain ratin  At best pain ratin  At worst pain ratin  Progression: no change    Hand dominance: right    Patient Goals  Patient goal: decrease finger numbness        Objective     Tenderness     Additional Tenderness Details  Tender over guyon's canal with parathesias into ring/small fingers  Active Range of Motion     Left Wrist   Wrist flexion: 46 degrees   Wrist extension: 50 degrees     Right Wrist   Wrist flexion: 50 degrees   Wrist extension: 50 degrees     Right Thumb   Opposition: Thumb ROM intact    Strength/Myotome Testing     Left Wrist/Hand   Normal wrist strength     (2nd hand position)     Trial 1: 35    Trial 2: 15    Trial 3: 15    Thumb Strength  Key/Lateral Pinch     Trail 1: 10  Tip/Two-Point Pinch     Trial 1: 4  Palmar/Three-Point Pinch     Trial 1: 6    Right Wrist/Hand   Normal wrist strength     (2nd hand position)     Trial 1: 15    Trial 2: 15    Trial 3: 15    Thumb Strength   Key/Lateral Pinch     Trial 1: 6  Tip/Two-Point Pinch     Trial 1: 4  Palmar/Three-Point Pinch     Trial 1: 6    Tests     Right Elbow   Positive Tinel's sign (cubital tunnel)  Left Wrist/Hand   Negative Phalen's sign and Tinel's sign (medial nerve)  Right Wrist/Hand   Negative Phalen's sign and Tinel's sign (medial nerve)  Additional Tests Details  (+) elbow flexion test on right     Slightly (+) sign on left with elbow flexion test     General Comments:      Wrist/Hand Comments  Monofilament test:    Normal bilaterally             Precautions: universal      Manual              Median/ulnar nerve gliding                                                                     Exercise Diary              Forearm flexibility exercises             Ergonomic edu             Activity modification                                                                                                                                                                                                                                              Modalities

## 2020-02-20 NOTE — PROGRESS NOTES
Assessment/Plan   Problem List Items Addressed This Visit        Other    Gynecologic exam normal - Primary     Pap guidelines reviewed  Pap deferred secondary to negative Pap in HPV in 2017  Reviewed insomnia and irritability prior to menses  Patient reports tolerable at this time  Reviewed option of low-dose SSRI, melatonin, following primary care doctor to discuss other sleep aids  Patient will continue to monitor and call office if desires other methods  Return to office for annual or as needed  Other Visit Diagnoses     Encounter for screening mammogram for malignant neoplasm of breast        Relevant Orders    Mammo screening bilateral w 3d & cad          Subjective:     Patient ID: Zoe Jc is a 48 y o  y o  female  HPI  49 yo seen for annual exam  Menses still regular but have been shorter and lighter over the last year  Reports hot flashes, mostly at night, occasional, tolerates ok  Patient reports 1 week prior to menses has insomnia and irritability  Patient's sister has ER positive breast cancer, patient is hesitant to use any HRT  Last pap: 11/20/2017 NILM (-)HRHPV  The following portions of the patient's history were reviewed and updated as appropriate:   She  has a past medical history of Hyperlipidemia  She   Patient Active Problem List    Diagnosis Date Noted    Gynecologic exam normal 02/21/2020    Tinea corporis 09/04/2019    Well woman exam 11/26/2018    Hyperlipemia 11/20/2017    Thyromegaly 11/20/2017     She  has no past surgical history on file  Her family history includes Breast cancer in her sister; Diabetes in her sister; Factor V Leiden deficiency in her sister  She  reports that she has never smoked  She has never used smokeless tobacco  She reports that she drank alcohol  She reports that she does not use drugs  No current outpatient medications on file  No current facility-administered medications for this visit        She has No Known Allergies       Menstrual History:  OB History        0    Para   0    Term   0       0    AB   0    Living   0       SAB   0    TAB   0    Ectopic   0    Multiple   0    Live Births   0                Menarche age: 10  Patient's last menstrual period was 2020 (approximate)  Period Cycle (Days): 30  Period Duration (Days): 5  Period Pattern: Regular  Menstrual Flow: Light  Dysmenorrhea: None      Review of Systems   Constitutional: Negative for fatigue, fever and unexpected weight change  HENT: Negative for dental problem and sinus pressure  Eyes: Negative for visual disturbance  Respiratory: Negative for cough, shortness of breath and wheezing  Cardiovascular: Negative for chest pain  Gastrointestinal: Negative for abdominal pain, blood in stool, constipation, diarrhea, nausea and vomiting  Endocrine: Negative for polydipsia  Genitourinary: Negative for difficulty urinating, dyspareunia, dysuria, frequency, hematuria, pelvic pain and urgency  Musculoskeletal: Negative for arthralgias and back pain  Neurological: Negative for dizziness, seizures, light-headedness and headaches  Psychiatric/Behavioral: Negative for suicidal ideas  The patient is not nervous/anxious  Objective:  Vitals:    20 1013   BP: 120/80   BP Location: Left arm   Patient Position: Sitting   Cuff Size: Standard   Weight: 76 7 kg (169 lb)   Height: 5' 1" (1 549 m)      Physical Exam   Constitutional: She is oriented to person, place, and time  She appears well-developed and well-nourished  Genitourinary: Rectum normal, vagina normal and uterus normal  Pelvic exam was performed with patient supine  There is no rash, tenderness, lesion, injury or Bartholin's cyst on the right labia  There is no rash, tenderness, lesion, injury or Bartholin's cyst on the left labia  Vagina exhibits no lesion  No erythema, tenderness or bleeding in the vagina  No signs of injury around the vagina   No vaginal discharge found  Right adnexum does not display mass, does not display tenderness and does not display fullness  Left adnexum does not display mass, does not display tenderness and does not display fullness  Cervix does not exhibit motion tenderness, lesion or discharge  Uterus is not enlarged, tender, exhibiting a mass, irregular (is regular) or mobile  Rectal exam shows no external hemorrhoid, no internal hemorrhoid, no fissure, no mass, no tenderness, anal tone normal and guaiac negative stool  HENT:   Head: Normocephalic and atraumatic  Neck: No thyromegaly present  Cardiovascular: Normal rate, regular rhythm and normal heart sounds  Exam reveals no gallop and no friction rub  No murmur heard  Pulmonary/Chest: Effort normal and breath sounds normal  No respiratory distress  She has no wheezes  Right breast exhibits no inverted nipple, no mass, no nipple discharge, no skin change and no tenderness  Left breast exhibits no inverted nipple, no mass, no nipple discharge, no skin change and no tenderness  No breast swelling, tenderness, discharge or bleeding  Breasts are symmetrical    Abdominal: Soft  She exhibits no distension and no mass  There is no tenderness  There is no rebound and no guarding  No hernia  Lymphadenopathy:     She has no cervical adenopathy  Right: No inguinal adenopathy present  Left: No inguinal adenopathy present  Neurological: She is alert and oriented to person, place, and time  Skin: Skin is warm and dry  Psychiatric: She has a normal mood and affect   Her behavior is normal

## 2020-02-21 PROBLEM — Z01.419 GYNECOLOGIC EXAM NORMAL: Status: ACTIVE | Noted: 2020-02-21

## 2020-02-22 NOTE — ASSESSMENT & PLAN NOTE
Pap guidelines reviewed  Pap deferred secondary to negative Pap in HPV in 2017  Reviewed insomnia and irritability prior to menses  Patient reports tolerable at this time  Reviewed option of low-dose SSRI, melatonin, following primary care doctor to discuss other sleep aids  Patient will continue to monitor and call office if desires other methods  Return to office for annual or as needed

## 2020-02-25 ENCOUNTER — HOSPITAL ENCOUNTER (OUTPATIENT)
Dept: RADIOLOGY | Facility: HOSPITAL | Age: 55
Discharge: HOME | End: 2020-02-25
Attending: SURGERY
Payer: COMMERCIAL

## 2020-02-25 DIAGNOSIS — R22.31 MASS OF RIGHT HAND: ICD-10-CM

## 2020-02-25 PROCEDURE — 20604 DRAIN/INJ JOINT/BURSA W/US: CPT

## 2020-02-25 RX ORDER — LIDOCAINE HYDROCHLORIDE 10 MG/ML
1 INJECTION, SOLUTION EPIDURAL; INFILTRATION; INTRACAUDAL; PERINEURAL ONCE
Status: COMPLETED | OUTPATIENT
Start: 2020-02-25 | End: 2020-02-25

## 2020-02-25 RX ADMIN — LIDOCAINE HYDROCHLORIDE 1 ML: 10 INJECTION, SOLUTION EPIDURAL; INFILTRATION; INTRACAUDAL; PERINEURAL at 09:15

## 2020-02-27 ENCOUNTER — APPOINTMENT (OUTPATIENT)
Dept: OCCUPATIONAL THERAPY | Age: 55
End: 2020-02-27
Payer: COMMERCIAL

## 2021-02-22 NOTE — PROGRESS NOTES
Assessment/Plan   Problem List Items Addressed This Visit        Other    Gynecologic exam normal - Primary     Pap guidelines reviewed  Pap deferred secondary to negative pap and HPV in 2017 in this low risk patient  Continue to monitor menses  1 year no menses equals menopause  If has bleeding after that year will need to call office for work up for postmenopausal bleeding  Script for mammogram for next December given  Return to office for annual or as needed  Other Visit Diagnoses     Encounter for screening mammogram for malignant neoplasm of breast        Relevant Orders    Mammo screening bilateral w 3d & cad          Subjective:     Patient ID: Ingris Rico is a 50 y o  y o  female  HPI  51 yo seen for annual exam  Since Oct, Nov 2020 of last year still monthly and last about 5 days but much lighter  Denies bowel or bladder issues  Starting to get some hot flashes at night  Last pap: 11/20/2017 NILM (-)HRHPV  Last [facility]: 12/2020 [facility] will request records  Last colonoscopy: 3/7/2019 good for 5 years  The following portions of the patient's history were reviewed and updated as appropriate:   She  has a past medical history of Hyperlipidemia  She   Patient Active Problem List    Diagnosis Date Noted    Gynecologic exam normal 02/21/2020    Tinea corporis 09/04/2019    Well woman exam 11/26/2018    Hyperlipemia 11/20/2017    Thyromegaly 11/20/2017     She  has a past surgical history that includes US guided msk procedure (2/25/2020)  Her family history includes Breast cancer in her sister; Diabetes in her sister; Factor V Leiden deficiency in her sister  She  reports that she has never smoked  She has never used smokeless tobacco  She reports previous alcohol use  She reports that she does not use drugs    Current Outpatient Medications   Medication Sig Dispense Refill    Calcium Carb-Cholecalciferol (CALCIUM 500+D3 PO) Take by mouth      multivitamin (THERAGRAN) TABS Take 1 tablet by mouth daily       No current facility-administered medications for this visit  She has No Known Allergies       Menstrual History:  OB History        0    Para   0    Term   0       0    AB   0    Living   0       SAB   0    TAB   0    Ectopic   0    Multiple   0    Live Births   0                  Patient's last menstrual period was 02/10/2021 (exact date)  Review of Systems   Constitutional: Negative for fatigue, fever and unexpected weight change  HENT: Negative for dental problem and sinus pressure  Eyes: Negative for visual disturbance  Respiratory: Negative for cough, shortness of breath and wheezing  Cardiovascular: Negative for chest pain  Gastrointestinal: Negative for abdominal pain, blood in stool, constipation, diarrhea, nausea and vomiting  Endocrine: Negative for polydipsia  Genitourinary: Negative for difficulty urinating, dyspareunia, dysuria, frequency, hematuria, pelvic pain and urgency  Musculoskeletal: Negative for arthralgias and back pain  Neurological: Negative for dizziness, seizures, light-headedness and headaches  Psychiatric/Behavioral: Negative for suicidal ideas  The patient is not nervous/anxious  Objective:  Vitals:    21 1325   BP: 122/80   BP Location: Left arm   Patient Position: Sitting   Cuff Size: Standard   Weight: 78 5 kg (173 lb)   Height: 5' 2" (1 575 m)      Physical Exam  Constitutional:       Appearance: Normal appearance  She is well-developed  Genitourinary:      Pelvic exam was performed with patient supine  Vulva, urethra, bladder, vagina, uterus and rectum normal       No vulval condylomata, lesion, tenderness, ulcerations, Bartholin's cyst or rash noted  No signs of labial injury  No labial fusion  No inguinal adenopathy present in the right or left side       No urethral prolapse, pain, swelling, tenderness, caruncle, mass or diverticulum present  Bladder is not distended or tender  No signs of injury or lesions in the vagina  No vaginal discharge, erythema, tenderness or bleeding  No cervical motion tenderness, discharge or lesion  Uterus is not enlarged, tender, irregular or mobile  No uterine mass detected  No right or left adnexal mass present  Right adnexa not tender or full  Left adnexa not tender or full  Rectum:      Guaiac result negative  No rectal mass, anal fissure, tenderness, external hemorrhoid, internal hemorrhoid or abnormal anal tone  HENT:      Head: Normocephalic and atraumatic  Neck:      Thyroid: No thyromegaly  Cardiovascular:      Rate and Rhythm: Normal rate and regular rhythm  Heart sounds: Normal heart sounds  No murmur  No friction rub  No gallop  Pulmonary:      Effort: Pulmonary effort is normal  No respiratory distress  Breath sounds: Normal breath sounds  No wheezing  Chest:      Breasts: Breasts are symmetrical          Right: Normal  No swelling, bleeding, inverted nipple, mass, nipple discharge, skin change or tenderness  Left: Normal  No swelling, bleeding, inverted nipple, mass, nipple discharge, skin change or tenderness  Abdominal:      General: There is no distension  Palpations: Abdomen is soft  There is no mass  Tenderness: There is no abdominal tenderness  There is no guarding or rebound  Hernia: No hernia is present  Lymphadenopathy:      Cervical: No cervical adenopathy  Upper Body:      Right upper body: No supraclavicular, axillary or pectoral adenopathy  Left upper body: No supraclavicular, axillary or pectoral adenopathy  Lower Body: No right inguinal adenopathy  No left inguinal adenopathy  Neurological:      Mental Status: She is alert and oriented to person, place, and time  Skin:     General: Skin is warm and dry     Psychiatric:         Behavior: Behavior normal

## 2021-02-23 ENCOUNTER — ANNUAL EXAM (OUTPATIENT)
Dept: OBGYN CLINIC | Facility: CLINIC | Age: 56
End: 2021-02-23
Payer: COMMERCIAL

## 2021-02-23 VITALS
DIASTOLIC BLOOD PRESSURE: 80 MMHG | WEIGHT: 173 LBS | SYSTOLIC BLOOD PRESSURE: 122 MMHG | HEIGHT: 62 IN | BODY MASS INDEX: 31.83 KG/M2

## 2021-02-23 DIAGNOSIS — Z01.419 GYNECOLOGIC EXAM NORMAL: Primary | ICD-10-CM

## 2021-02-23 DIAGNOSIS — Z12.31 ENCOUNTER FOR SCREENING MAMMOGRAM FOR MALIGNANT NEOPLASM OF BREAST: ICD-10-CM

## 2021-02-23 PROCEDURE — 99396 PREV VISIT EST AGE 40-64: CPT | Performed by: PHYSICIAN ASSISTANT

## 2021-02-23 RX ORDER — DIPHENOXYLATE HYDROCHLORIDE AND ATROPINE SULFATE 2.5; .025 MG/1; MG/1
1 TABLET ORAL DAILY
COMMUNITY

## 2021-02-23 NOTE — ASSESSMENT & PLAN NOTE
Pap guidelines reviewed  Pap deferred secondary to negative pap and HPV in 2017 in this low risk patient  Continue to monitor menses  1 year no menses equals menopause  If has bleeding after that year will need to call office for work up for postmenopausal bleeding  Script for mammogram for next December given  Return to office for annual or as needed

## 2021-02-24 ENCOUNTER — TELEPHONE (OUTPATIENT)
Dept: ADMINISTRATIVE | Facility: OTHER | Age: 56
End: 2021-02-24

## 2021-02-24 NOTE — TELEPHONE ENCOUNTER
Upon review of the In Basket request and the patient's chart, initial outreach has been made via fax, please see Contacts section for details       Thank you  Ezequiel Palacio MA

## 2021-02-24 NOTE — TELEPHONE ENCOUNTER
Upon review of the In Basket request we were able to locate, review, and update the patient chart as requested for Mammogram     Any additional questions or concerns should be emailed to the Practice Liaisons via [email or web address] email, please do not reply via In Basket      Thank you  Siomara Gonzalez MA

## 2021-02-24 NOTE — TELEPHONE ENCOUNTER
----- Message from Isabella Irby PA-C sent at 2/23/2021  1:42 PM EST -----  Patient had mammogram done 12/2020 at [facility]

## 2022-06-20 NOTE — PROGRESS NOTES
Assessment/Plan   Diagnoses and all orders for this visit:    Well woman exam        Discussion    All questions have been answered to her satisfaction  RTO for APE or sooner if needed      Subjective     Periods still coming monthly  Some mths the flow is heavier but tolerable  Does not complain of significant hot flashes they do happen occasionally a few days before her cycle  Pt has a sister w ER + breast cancer, she is hesitant to use any HRT in the future  I advised pt dietary and lifestyle habits that can worse menopausal sx and things to avoid  Reid Sam is a 48 y o  female who presents for annual well woman exam    LMP -10/16/18 ; Denies  bleeding  No vulvar itch/burn; No vaginal itch/burn; No abn discharge or odor; No urinary sx - burning/pain/frequency/hematuria  (+) SBEs - no breast masses, asymmetry, nipple discharge or bleeding, changes in skin of breast, or breast tenderness bilaterally  No abd/pelvic pain or HAs; No menopausal symptoms: No hot flashes/night sweats, problems with intercourse, vaginal dryness; sleeping well  Pt is not sexually active  She declines std/hiv/hep testing; Feels safe at home  Condom use:  (+) PCP for routine Bw/care-Dr Brigid Barnett    Last Pap - 11/20/17 WNL  History of abnormal Pap smear: none  Last mammo - 12/13/17 WNL  History of abnormal mammogram: none  Last colonoscopy - has it scheduled early 2019    Review of Systems   Constitutional: Negative for fatigue, fever and unexpected weight change  HENT: Negative for dental problem, mouth sores, nosebleeds, rhinorrhea, sinus pain, sinus pressure and sore throat  Eyes: Negative for pain, discharge and visual disturbance  Respiratory: Negative for cough, chest tightness, shortness of breath and wheezing  Cardiovascular: Negative for chest pain, palpitations and leg swelling  Gastrointestinal: Negative for blood in stool, constipation, diarrhea, nausea and vomiting     Endocrine: Negative for polydipsia  Genitourinary: Negative for difficulty urinating, dyspareunia, dysuria, menstrual problem, pelvic pain, urgency, vaginal discharge and vaginal pain  Musculoskeletal: Negative for arthralgias, back pain and joint swelling  Allergic/Immunologic: Negative for environmental allergies  Neurological: Negative for seizures, light-headedness and headaches  Hematological: Does not bruise/bleed easily  Psychiatric/Behavioral: Negative for sleep disturbance  The patient is not nervous/anxious  The following portions of the patient's history were reviewed and updated as appropriate: allergies, current medications, past family history, past medical history, past social history, past surgical history and problem list          OB History     No data available          No past medical history on file  No past surgical history on file  Family History   Problem Relation Age of Onset    Breast cancer Sister     Factor V Leiden deficiency Sister     Diabetes Sister        Social History     Social History    Marital status: Single     Spouse name: N/A    Number of children: N/A    Years of education: N/A     Occupational History    Not on file       Social History Main Topics    Smoking status: Never Smoker    Smokeless tobacco: Not on file    Alcohol use No    Drug use: No    Sexual activity: Not on file     Other Topics Concern    Not on file     Social History Narrative    No narrative on file         Current Outpatient Prescriptions:     methylprednisolone (MEDROL) 4 mg tablet, Dosepack (follow instructions on packaging) All doses PO, 6 tabs/day1, 5 tabs/day 2, 4 tabs/day3, 3 tabs/day4, 2 tabs/day5, 1 tab/day6, Disp: 21 tablet, Rfl: 0    morphine (MSIR) 15 mg tablet, Take 1 tablet by mouth every 4 (four) hours as needed for severe pain for up to 15 doses Max Daily Amount: 90 mg, Disp: 15 tablet, Rfl: 0    No Known Allergies    Objective   There were no vitals filed for this visit   Physical Exam   Constitutional: She is oriented to person, place, and time  She appears well-developed and well-nourished  HENT:   Head: Normocephalic and atraumatic  Cardiovascular: Normal rate and regular rhythm  Pulmonary/Chest: Effort normal and breath sounds normal  Right breast exhibits no inverted nipple, no mass, no nipple discharge, no skin change and no tenderness  Left breast exhibits no inverted nipple, no mass, no nipple discharge, no skin change and no tenderness  Breasts are symmetrical    Abdominal: Soft  She exhibits no distension and no mass  There is no rebound and no guarding  Genitourinary: Vagina normal and uterus normal  Rectal exam shows guaiac negative stool  Neurological: She is alert and oriented to person, place, and time  Skin: Skin is warm and dry  Psychiatric: She has a normal mood and affect  There are no Patient Instructions on file for this visit  <-- Click to add NO significant Past Surgical History

## 2022-09-15 ENCOUNTER — ANNUAL EXAM (OUTPATIENT)
Dept: OBGYN CLINIC | Facility: CLINIC | Age: 57
End: 2022-09-15
Payer: COMMERCIAL

## 2022-09-15 VITALS
WEIGHT: 170 LBS | DIASTOLIC BLOOD PRESSURE: 80 MMHG | SYSTOLIC BLOOD PRESSURE: 120 MMHG | BODY MASS INDEX: 32.1 KG/M2 | HEIGHT: 61 IN

## 2022-09-15 DIAGNOSIS — Z01.419 WOMEN'S ANNUAL ROUTINE GYNECOLOGICAL EXAMINATION: Primary | ICD-10-CM

## 2022-09-15 DIAGNOSIS — N95.0 POSTMENOPAUSAL BLEEDING: ICD-10-CM

## 2022-09-15 PROCEDURE — 99396 PREV VISIT EST AGE 40-64: CPT | Performed by: STUDENT IN AN ORGANIZED HEALTH CARE EDUCATION/TRAINING PROGRAM

## 2022-09-15 PROCEDURE — 0503F POSTPARTUM CARE VISIT: CPT | Performed by: STUDENT IN AN ORGANIZED HEALTH CARE EDUCATION/TRAINING PROGRAM

## 2022-09-15 NOTE — PROGRESS NOTES
[facility]  Well Woman Annual Exam  Sebastian    Assessment   Indira Miller "Denice Reddy" is a 56 y o  postmenopausal female who is presenting for annual exam     Plan   · The patient previously had normal pap in 2017, pap with HPV performed today  The current ASCCP guidelines were reviewed  The low risk patient will receive pap smear screening every 3 years or pap with HPV co-testing every 5 years  · The patient declines STD testing  · Last Mammogram: 2021, mammogram ordered today  Reviewed ACOG recommendations of yearly mammogram for breast cancer screening   · Last Colonoscopy 2019, Patient due next year  · I reviewed the patients risk factors for osteoporosis  · I emphasized the importance of an annual pelvic and breast exam    · I have discussed the importance of monthly self-breast exams, exercise and healthy diet as well as adequate intake of calcium and vitamin D   · Reviewed single bleeding episode after 6 months of amenorrhea  Given associated symptoms sounds like she is menopausal and bleeding is abnormal and associated with increased risk of endometrial hyperplasia or carcinoma (abnormal growth), should be monitored/surveilled  Can (1) return for endobx, (2) ultrasound now and if abnormal endobx, if normal continue to monitor, or (3) continue to monitor if another episode of bleeding NEEDS ENDOMETRIAL SAMPLING  Will consider options  · All questions answered to the best of my ability       __________________________________________________________________      Sasha Loza is a 56 y o  postmenopausal female presenting for annual exam  She is without complaint      GYN  Complaints:  Last November was last normal period, and then in July had 5 days  Prior to November had been having menses every month, lasting for 5 days  Had been having night sweats, hot flashes, tapered off  Now occasional, but not too bothersome  Lots of insomnia, controlled with melatonin but only uses when doesn't have to work (10mg tabs), motrin with sleep aid but also leaves her groggy the next day  Still working--, very busy    Not working out on a regular basis  Gained some weight in the last few years  Drinks rarely, only when goes out to dinner (5x a year)  Lots of caffeine, but in morning  Nighttime working and eating dinner late at night    Minimal sugar, well-balanced diet    Sexually active: No  Hx STI: denies   Hx Abnormal pap: denies  Last pap: 2017    OB         Complaints: occasional ERLINDA sometimes with sneezing (sometimes using liner) not bothersome enough to speak with Urologist or pelvic floor PT  Denies hematuria and dysuria    BREAST  Complaints: denies  Denies: breast lump, nipple discharge and skin color change  Last mammogram: 2021  Personal hx: no breast biopsy  Family hx: sister diagnosed with breast cancer, had surgery, doing well, neg BRCA, not hereditary  denies fhx of uterine, ovarian, or colon cancers  Patient does do regular self-exams    GENERAL  PMH reviewed/updated and is as below  Patient does follow with a PCP  Works as a [employer]   Denies domestic violence  Exercise: not much, working on increasing  Diet: well-balanced    SCREENING  Cervical Ca: Last pap  and results were normal  Breast Ca: Last mammogram 2021 and results were normal  Colon Ca:  Last colonoscopy , going every 5 years, will return next year    Metabolic: due for physical going in a couple weeks    Past Medical History:   Diagnosis Date    Hyperlipidemia        Past Surgical History:   Procedure Laterality Date    US GUIDED MSK PROCEDURE  2020         Current Outpatient Medications:     Calcium Carb-Cholecalciferol (CALCIUM 500+D3 PO), Take by mouth, Disp: , Rfl:     fexofenadine-pseudoephedrine (ALLEGRA-D)  MG per tablet, Take 1 tablet by mouth, Disp: , Rfl:     ibuprofen (MOTRIN) 200 mg tablet, Take by mouth every 6 (six) hours as needed for mild pain, Disp: , Rfl:    multivitamin (THERAGRAN) TABS, Take 1 tablet by mouth daily, Disp: , Rfl:     ipratropium (ATROVENT) 0 03 % nasal spray, 2 sprays into each nostril every 12 (twelve) hours, Disp: 30 mL, Rfl: 5    No Known Allergies    Social History     Socioeconomic History    Marital status: Single     Spouse name: Not on file    Number of children: Not on file    Years of education: Not on file    Highest education level: Not on file   Occupational History    Not on file   Tobacco Use    Smoking status: Never Smoker    Smokeless tobacco: Never Used   Vaping Use    Vaping Use: Never used   Substance and Sexual Activity    Alcohol use: Not Currently    Drug use: No    Sexual activity: Not Currently   Other Topics Concern    Not on file   Social History Narrative    Not on file     Social Determinants of Health     Financial Resource Strain: Not on file   Food Insecurity: Not on file   Transportation Needs: Not on file   Physical Activity: Not on file   Stress: Not on file   Social Connections: Not on file   Intimate Partner Violence: Not on file   Housing Stability: Not on file     Lives alone, but lives near sister and her , nephew  Not currently in a relationship       Review of Systems  Review of Systems   Constitutional: Negative for unexpected weight change  Respiratory: Negative for shortness of breath  Cardiovascular: Negative for chest pain  Gastrointestinal: Negative for abdominal pain  Genitourinary: Negative for pelvic pain  Objective  /80 (BP Location: Left arm, Patient Position: Sitting, Cuff Size: Standard)   Ht 5' 1" (1 549 m)   Wt 77 1 kg (170 lb)   LMP 07/17/2022   BMI 32 12 kg/m²      Physical Exam:  Physical Exam  Constitutional:       Appearance: Normal appearance  She is normal weight  HENT:      Head: Normocephalic  Eyes:      Extraocular Movements: Extraocular movements intact        Conjunctiva/sclera: Conjunctivae normal    Neck:      Comments: No thyromegaly or palpable thyroid nodules  Cardiovascular:      Rate and Rhythm: Normal rate and regular rhythm  Heart sounds: Normal heart sounds  Pulmonary:      Effort: Pulmonary effort is normal       Breath sounds: Normal breath sounds  Abdominal:      General: There is no distension  Palpations: Abdomen is soft  Tenderness: There is no abdominal tenderness  There is no guarding  Genitourinary:     Comments: Vulva: normal, no lesions  Vagina: normal, no lesions or ttp  Urethra: normal, no lesions, masses or ttp  Bladder: normal, no masses or ttp  Cervix: normal, no lesions, masses or CMT  Uterus: normal-size  Adnexa: no masses or ttp    Musculoskeletal:         General: Normal range of motion  Cervical back: Normal range of motion and neck supple  No tenderness  Lymphadenopathy:      Cervical: No cervical adenopathy  Skin:     General: Skin is warm and dry  Neurological:      General: No focal deficit present  Psychiatric:         Mood and Affect: Mood normal          Behavior: Behavior normal          Thought Content:  Thought content normal        Breast inspection negative, no nipple discharge or bleeding, no masses or nodularity palpable  Rectal negative, stool guaiac negative

## 2022-09-19 LAB
CLINICAL INFO: NORMAL
CYTO CVX: NORMAL
DATE PREVIOUS BX: NORMAL
HPV E6+E7 MRNA CVX QL NAA+PROBE: NOT DETECTED
LMP START DATE: NORMAL
SL AMB PREV. PAP:: NORMAL
SPECIMEN SOURCE CVX/VAG CYTO: NORMAL

## 2023-07-15 NOTE — LETTER
Procedure Request Form: Mammogram      Date Requested: 21  Patient: Orlando Serge  Patient : 1965   Referring Provider: Inez Martin, MD        Date of Procedure ______________________________       The above patient has informed us that they have completed their   most recent Mammogram at your facility  Please complete   this form and attach all corresponding procedure reports/results  Comments __________________________________________________________  ____________________________________________________________________  ____________________________________________________________________  ____________________________________________________________________    Facility Completing Procedure _________________________________________    Form Completed By (print name) _______________________________________      Signature __________________________________________________________      These reports are needed for  compliance    Please fax this completed form and a copy of the procedure report to our office located at [place] [address] as soon as possible to [phone]: Phone [phone]    We thank you for your assistance in treating our mutual patient Cimzia Counseling:  I discussed with the patient the risks of Cimzia including but not limited to immunosuppression, allergic reactions and infections.  The patient understands that monitoring is required including a PPD at baseline and must alert us or the primary physician if symptoms of infection or other concerning signs are noted.

## 2023-12-21 ENCOUNTER — TELEPHONE (OUTPATIENT)
Dept: OBGYN CLINIC | Facility: CLINIC | Age: 58
End: 2023-12-21

## 2023-12-21 DIAGNOSIS — Z12.31 ENCOUNTER FOR SCREENING MAMMOGRAM FOR MALIGNANT NEOPLASM OF BREAST: Primary | ICD-10-CM

## 2023-12-21 NOTE — TELEPHONE ENCOUNTER
Noted last completed b/l screening mammogram 12/15/22 with recommendations for annual screening f/u noted. Order placed in chart.

## 2024-02-21 PROBLEM — Z01.419 GYNECOLOGIC EXAM NORMAL: Status: RESOLVED | Noted: 2020-02-21 | Resolved: 2024-02-21

## 2024-02-29 ENCOUNTER — OFFICE VISIT (OUTPATIENT)
Dept: URGENT CARE | Age: 59
End: 2024-02-29
Payer: COMMERCIAL

## 2024-02-29 VITALS
DIASTOLIC BLOOD PRESSURE: 99 MMHG | SYSTOLIC BLOOD PRESSURE: 146 MMHG | HEART RATE: 84 BPM | OXYGEN SATURATION: 99 % | TEMPERATURE: 96.9 F | RESPIRATION RATE: 20 BRPM

## 2024-02-29 DIAGNOSIS — R68.89 FLU-LIKE SYMPTOMS: Primary | ICD-10-CM

## 2024-02-29 PROCEDURE — G0382 LEV 3 HOSP TYPE B ED VISIT: HCPCS | Performed by: NURSE PRACTITIONER

## 2024-02-29 PROCEDURE — 87636 SARSCOV2 & INF A&B AMP PRB: CPT | Performed by: NURSE PRACTITIONER

## 2024-02-29 RX ORDER — BROMPHENIRAMINE MALEATE, PSEUDOEPHEDRINE HYDROCHLORIDE, AND DEXTROMETHORPHAN HYDROBROMIDE 2; 30; 10 MG/5ML; MG/5ML; MG/5ML
10 SYRUP ORAL 3 TIMES DAILY PRN
Qty: 150 ML | Refills: 0 | Status: SHIPPED | OUTPATIENT
Start: 2024-02-29

## 2024-02-29 NOTE — PROGRESS NOTES
[facility]        NAME: Tami Rucker is a 58 y.o. female  : 1965    MRN: [id number]  DATE: 2024  TIME: 4:06 PM    Assessment and Plan   Flu-like symptoms [R68.89]  1. Flu-like symptoms  brompheniramine-pseudoephedrine-DM 30-2-10 MG/5ML syrup    Cov/Flu - Lab Collect            Patient Instructions     Flu/PCR tested; results in 1-2 days  Take med as prescribed   Follow up with PCP in 3-5 days.  Proceed to  ER if symptoms worsen.    If tests have been performed at [facility], our office will contact you with results if changes need to be made to the care plan discussed with you at the visit.  [facility]    Chief Complaint     Chief Complaint   Patient presents with    Cough    Earache    Sore Throat     Cough, both ear pain and sore throat since yesterday.         History of Present Illness       HPI  Presents to clinic with complaint of cough, ear pain, sore throat, congestion.  Started yesterday.  States several family members have flulike symptoms.  Denies fever.  No shortness of breath.  Did not do a home COVID test.  No recent travel    Review of Systems   Review of Systems   Constitutional:  Negative for fever.   HENT:  Positive for congestion, rhinorrhea and sore throat.    Respiratory:  Positive for cough. Negative for chest tightness, shortness of breath and wheezing.    Cardiovascular:  Negative for chest pain.   Gastrointestinal:  Negative for diarrhea and vomiting.   Musculoskeletal:  Positive for myalgias.   Neurological:  Negative for headaches.         Current Medications       Current Outpatient Medications:     brompheniramine-pseudoephedrine-DM 30-2-10 MG/5ML syrup, Take 10 mL by mouth 3 (three) times a day as needed for cough or congestion, Disp: 150 mL, Rfl: 0    Calcium Carb-Cholecalciferol (CALCIUM 500+D3 PO), Take by mouth, Disp: , Rfl:     fexofenadine-pseudoephedrine (ALLEGRA-D)  MG per tablet, Take 1  tablet by mouth, Disp: , Rfl:     ibuprofen (MOTRIN) 200 mg tablet, Take by mouth every 6 (six) hours as needed for mild pain, Disp: , Rfl:     multivitamin (THERAGRAN) TABS, Take 1 tablet by mouth daily, Disp: , Rfl:     ipratropium (ATROVENT) 0.03 % nasal spray, 2 sprays into each nostril every 12 (twelve) hours (Patient not taking: Reported on 2/29/2024), Disp: 30 mL, Rfl: 5    Current Allergies     Allergies as of 02/29/2024    (No Known Allergies)            The following portions of the patient's history were reviewed and updated as appropriate: allergies, current medications, past family history, past medical history, past social history, past surgical history and problem list.     Past Medical History:   Diagnosis Date    Hyperlipidemia        Past Surgical History:   Procedure Laterality Date    US GUIDED MSK PROCEDURE  2/25/2020       Family History   Problem Relation Age of Onset    Breast cancer Sister     Factor V Leiden deficiency Sister     Diabetes Sister     Heart disease Family          Medications have been verified.        Objective   /99   Pulse 84   Temp (!) 96.9 °F (36.1 °C) (Temporal)   Resp 20   SpO2 99%   No LMP recorded. Patient is premenopausal.       Physical Exam     Physical Exam  HENT:      Right Ear: Tympanic membrane normal.      Left Ear: Tympanic membrane normal.      Nose: Rhinorrhea present.      Mouth/Throat:      Pharynx: Posterior oropharyngeal erythema present.      Tonsils: 0 on the right. 0 on the left.   Cardiovascular:      Rate and Rhythm: Regular rhythm.      Heart sounds: Normal heart sounds.   Pulmonary:      Effort: Pulmonary effort is normal.      Breath sounds: Normal breath sounds.

## 2024-03-01 LAB
FLUAV RNA RESP QL NAA+PROBE: NEGATIVE
FLUBV RNA RESP QL NAA+PROBE: NEGATIVE
SARS-COV-2 RNA RESP QL NAA+PROBE: POSITIVE

## 2024-03-20 ENCOUNTER — HOSPITAL ENCOUNTER (OUTPATIENT)
Dept: RADIOLOGY | Age: 59
Discharge: HOME | End: 2024-03-20
Payer: COMMERCIAL

## 2024-03-20 VITALS — BODY MASS INDEX: 32.1 KG/M2 | WEIGHT: 170 LBS | HEIGHT: 61 IN

## 2024-03-20 DIAGNOSIS — Z12.31 ENCOUNTER FOR SCREENING MAMMOGRAM FOR MALIGNANT NEOPLASM OF BREAST: ICD-10-CM

## 2024-03-20 PROCEDURE — 77063 BREAST TOMOSYNTHESIS BI: CPT

## 2024-03-20 PROCEDURE — 77067 SCR MAMMO BI INCL CAD: CPT

## 2024-04-05 ENCOUNTER — ANNUAL EXAM (OUTPATIENT)
Dept: OBGYN CLINIC | Facility: CLINIC | Age: 59
End: 2024-04-05
Payer: COMMERCIAL

## 2024-04-05 VITALS
HEIGHT: 61 IN | DIASTOLIC BLOOD PRESSURE: 86 MMHG | BODY MASS INDEX: 34.55 KG/M2 | WEIGHT: 183 LBS | SYSTOLIC BLOOD PRESSURE: 130 MMHG

## 2024-04-05 DIAGNOSIS — Z01.419 WOMEN'S ANNUAL ROUTINE GYNECOLOGICAL EXAMINATION: Primary | ICD-10-CM

## 2024-04-05 DIAGNOSIS — N95.1 INSOMNIA ASSOCIATED WITH MENOPAUSE: ICD-10-CM

## 2024-04-05 DIAGNOSIS — Z12.11 SCREENING FOR COLON CANCER: ICD-10-CM

## 2024-04-05 DIAGNOSIS — N63.21 BREAST LUMP ON LEFT SIDE AT 2 O'CLOCK POSITION: ICD-10-CM

## 2024-04-05 DIAGNOSIS — N95.1 VASOMOTOR SYMPTOMS DUE TO MENOPAUSE: ICD-10-CM

## 2024-04-05 PROCEDURE — 99396 PREV VISIT EST AGE 40-64: CPT | Performed by: STUDENT IN AN ORGANIZED HEALTH CARE EDUCATION/TRAINING PROGRAM

## 2024-04-05 RX ORDER — GABAPENTIN 100 MG/1
100 CAPSULE ORAL
Qty: 30 CAPSULE | Refills: 0 | Status: SHIPPED | OUTPATIENT
Start: 2024-04-05

## 2024-04-05 NOTE — PROGRESS NOTES
"[facility]  Well Woman Annual Exam  Sebastian    Assessment   Tami \"Helga" is a 58 y.o. postmenopausal female presenting for annual exam.     Plan   Diagnoses and all orders for this visit:    Women's annual routine gynecological examination    Vasomotor symptoms due to menopause  -     gabapentin (Neurontin) 100 mg capsule; Take 1 capsule (100 mg total) by mouth daily at bedtime    Insomnia associated with menopause  -     gabapentin (Neurontin) 100 mg capsule; Take 1 capsule (100 mg total) by mouth daily at bedtime    Breast lump on left side at 2 o'clock position  -     Mammo diagnostic left w cad; Future  -     US breast left limited (diagnostic); Future    Screening for colon cancer  -     Ambulatory Referral to Gastroenterology; Future        Pap history uncomplicated and up to date, not ordered today  Last Mammogram: 2024 normal, ordered breast imaging for palpable left breast lump at 2 o'clock  Last Colonoscopy , due at end of this year, referral placed  I have discussed the importance of self-breast exams, exercise and healthy diet as well as adequate intake of calcium and vitamin D.     Discussed spectrum of options for vasomotor symptoms and vaginal dryness/narrowing. Accepts gabapentin--plan for 100mg QHS, will also need recent BMP.  All questions answered to the best of my ability.    __________________________________________________________________      Subjective   Tami is a 58 y.o. postmenopausal female presenting for annual exam.     GYN  Hot flashes, night sweats, insomnia, tried OTC stuff, minimal relief  With really bad insomnia will take nyquil sleep, which usually works    Nothing since 2022  Still working--, very busy     Not working out--previously went to gym but now that works from home  Has gained at least 20lbs since last time here  Taking multivitamin, calcium with D and vitamin C    Sexually active: No     OB          Complaints: " occasional ERLINDA sometimes with coughing/sneezing, not interested in urologist, PFPT  Denies hematuria and dysuria     BREAST  Complaints: denies  Denies: breast lump, nipple discharge and skin color change  Family hx: sister diagnosed with breast cancer, had surgery, doing well, neg BRCA, not hereditary  denies fhx of uterine, ovarian, or colon cancers  Patient does do regular self-exams     GENERAL  PMH reviewed/updated and is as below.   Patient does follow with a PCP.  Works as a .  Diet: well-balanced  Lives alone, feels safe     SCREENING  Pap 09/2022: NILM, HPV  Mammo 03/2024: BIRADS1  Colonoscopy 2019: repeat in 5 years (done at end of year)         Past Medical History:   Diagnosis Date    Hyperlipidemia        Past Surgical History:   Procedure Laterality Date    US GUIDED MSK PROCEDURE  2/25/2020         Current Outpatient Medications:     Calcium Carb-Cholecalciferol (CALCIUM 500+D3 PO), Take by mouth, Disp: , Rfl:     fexofenadine-pseudoephedrine (ALLEGRA-D)  MG per tablet, Take 1 tablet by mouth, Disp: , Rfl:     gabapentin (Neurontin) 100 mg capsule, Take 1 capsule (100 mg total) by mouth daily at bedtime, Disp: 30 capsule, Rfl: 0    ibuprofen (MOTRIN) 200 mg tablet, Take by mouth every 6 (six) hours as needed for mild pain, Disp: , Rfl:     multivitamin (THERAGRAN) TABS, Take 1 tablet by mouth daily, Disp: , Rfl:     brompheniramine-pseudoephedrine-DM 30-2-10 MG/5ML syrup, Take 10 mL by mouth 3 (three) times a day as needed for cough or congestion (Patient not taking: Reported on 4/5/2024), Disp: 150 mL, Rfl: 0    ipratropium (ATROVENT) 0.03 % nasal spray, 2 sprays into each nostril every 12 (twelve) hours (Patient not taking: Reported on 2/29/2024), Disp: 30 mL, Rfl: 5    No Known Allergies    Social History     Socioeconomic History    Marital status: Single     Spouse name: Not on file    Number of children: Not on file    Years of education: Not on file    Highest education level:  "Not on file   Occupational History    Not on file   Tobacco Use    Smoking status: Never    Smokeless tobacco: Never   Vaping Use    Vaping status: Never Used   Substance and Sexual Activity    Alcohol use: Not Currently    Drug use: No    Sexual activity: Not Currently   Other Topics Concern    Not on file   Social History Narrative    Not on file     Social Determinants of Health     Financial Resource Strain: Not on file   Food Insecurity: Not on file   Transportation Needs: Not on file   Physical Activity: Not on file   Stress: Not on file (2/11/2021)   Social Connections: Not on file   Intimate Partner Violence: Low Risk  (4/13/2021)    Received from [facility]    Intimate Partner Violence     Insults You: Not on file     Threatens You: Not on file     Screams at You: Not on file     Physically Hurt: Not on file     Intimate Partner Violence Score: Not on file   Housing Stability: Not on file            Review of Systems  Review of Systems   Respiratory:  Negative for shortness of breath.    Cardiovascular:  Negative for chest pain.   Gastrointestinal:  Negative for abdominal pain.   Genitourinary:  Negative for dysuria, pelvic pain, vaginal bleeding and vaginal discharge.       Objective  /86 (BP Location: Left arm, Patient Position: Sitting, Cuff Size: Large)   Ht 5' 1\" (1.549 m)   Wt 83 kg (183 lb)   LMP 08/01/2023 (Approximate)   BMI 34.58 kg/m²      Physical Exam:  Physical Exam  Constitutional:       Appearance: Normal appearance.   HENT:      Head: Normocephalic and atraumatic.   Eyes:      Extraocular Movements: Extraocular movements intact.      Conjunctiva/sclera: Conjunctivae normal.   Cardiovascular:      Rate and Rhythm: Normal rate and regular rhythm.      Heart sounds: Normal heart sounds.   Pulmonary:      Effort: Pulmonary effort is normal.      Breath sounds: Normal breath sounds.   Abdominal:      General: There is no distension.      Palpations: Abdomen is soft.      " Tenderness: There is no abdominal tenderness. There is no guarding.   Genitourinary:     Comments: Vulva: normal, no lesions  Vagina: hypoestrogenic pallor and thinning  Urethra: normal, no lesions, masses or ttp  Bladder: normal, no masses or ttp  Cervix: normal, no lesions, masses or CMT  Uterus: normal-size, normal mobility, good descensus  Adnexa: no masses or ttp    Musculoskeletal:         General: Normal range of motion.      Cervical back: Normal range of motion.   Skin:     General: Skin is warm and dry.   Neurological:      General: No focal deficit present.      Mental Status: She is alert.   Psychiatric:         Mood and Affect: Mood normal.         Behavior: Behavior normal.         Thought Content: Thought content normal.     Breast left breast at 2 o'clock had mobile, tender lump, otherwise negative, no nipple discharge or bleeding, no masses or nodularity palpable  Rectal deferred

## 2024-04-05 NOTE — PATIENT INSTRUCTIONS
"Vulvar Care    Maintaining a healthy vulva and vagina will help prevent infections and discomfort. Unusual changes in vaginal discharge is a sign that there might be an issue.        Why is vulvar and vagina care important?  Many women experience uncomfortable, vaginal infections (vaginitis) at one time or another. The area around the entrance to the vagina (vulva) can also become irritated. Steps can be taken to relieve and prevent vulvar discomfort and vaginal infections.    Not all vaginal infections are alike and home treatments can worsen some types. If you have any concerns about your vulvar or vaginal health, or notice unusual changes in vaginal discharge, contact your healthcare provider if the problem persists.    What is the vulva?  The vulva is the area of female sex organs that lies outside of the vagina. These organs include folds of sensitive tissue called the labia (labia means \"lips\"). The labia has two parts. The outermost folds are called the labia majora. A second set of folds, called the labia minora, is enclosed within the labia majora. The vulva also contains the mounded area made by the pubic bone (mons pubis), a small, round organ (clitoris), and the openings of the vagina and urinary canal (urethra).    What is the vagina?  The vagina is part of the female genitalia. It starts from the opening, called the introitus or inner part of the labia, and ends at the opening of the uterus called the cervix.    Why do vaginal infections happen?  Vaginal infections occur when bacteria, funguses or other organisms grow uncontrolled. Some of these organisms already live in the vagina and are kept at healthy levels by coexisting with other organisms. Infectious organisms can also be introduced into the vagina by improper hygiene or unsafe sex.    What is vulvar care?  The goal of vulvar care is to keep the vulva dry and free from irritants. In this way, you can prevent the vulva from becoming red, swollen " and irritated. Because many infections are introduced into the vagina, these tips also provide a basis for good, vaginal care.    What are some tips for vulvar care?  Use warm water to wash the vulva. Dry thoroughly with a clean towel. (If the vulva is very irritated, you can try drying it with a blow dryer set on cool.)  The vagina cleanses itself naturally in the form of normal, vaginal discharge. Avoid using douches unless prescribed by your physician. These products can upset the natural balance of organisms.  Wear only white, 100 percent cotton underwear. Avoid wearing nylon, acetate, or other manmade fibers if you have delicate skin or are prone to vulvar irritation.  Rinse underclothes carefully after washing or double-rinse. Avoid using too much laundry detergent.  Wash new underclothes before wearing.  Use a mild soap (such as Woolite®) for washing underclothes. Avoid fabric softeners (including dryer sheets) and detergents with enzymes (amylase, lipase, protease and cellulose).  Use soft toilet tissue (white only).  Use tampons instead of sanitary napkins to control menstrual bleeding. (Do not use deodorant tampons.) Do not leave tampons in for a long period, due to toxic shock syndrome. Do not leave tampons in all night.  Take sitz baths daily, if prescribed by your healthcare provider.  Don't scratch.  Avoid wearing nylon pantyhose or panty girdles. They trap heat and moisture, providing an ideal breeding environment for organisms. When nylons or leggings are required, wear cotton or nylons with a cotton panty.  Avoid these feminine hygiene products, which can irritate the vulva: sanitary pads, feminine spray and deodorants, scented oils, bubble baths, bath oils, talc or powder.  What over-the-counter products can be used for vaginal lubrication?    Vaginal moisturizers can be used for dryness, if needed. These can be water- or silicon-based products:  Replens® ([employer]ar[employer]) - Using applicator, apply  three times a week at bedtime to maintain normal vaginal moisture.  Gyne-Moistrin® ([employer])  Crisco vegetable shortening  Coconut oil    For use during intercourse (vaginal lubricants):  Astroglide® (Evin[employer]Lube[employer] Inc[employer]  Lubrin® Vaginal Suppository (DreamD[employer] - Developed for postmenopausal women  Condom-Mate® Vaginal Suppository (Upsher-Smith)-- Developed for use with condoms; same as Lubrin®, but smaller.  Today® Personal Lubricant (Made by manufacturers of the Today® Sponge)  K-Y Liquid® (Alfredo and Alfredo)  Emollients (products like Petroleum jelly) should be used in patients with vulvar irritation sparingly. It is not recommended to insert emollients vaginally.  Coconut oil  Vitamin E vaginal suppositories (on Amazon, Key-E supp)  Hyaluronic acid--Bonafide    Menopause    Menopause requires no medical treatment. Instead, treatments focus on relieving your signs and symptoms and preventing or managing chronic conditions that may occur with aging. Treatments may include:  Hormone therapy. Estrogen therapy is the most effective treatment option for relieving menopausal hot flashes. Depending on your personal and family medical history, your doctor may recommend estrogen in the lowest dose and the shortest time frame needed to provide symptom relief for you. If you still have your uterus, you'll need progestin in addition to estrogen. Estrogen also helps prevent bone loss. Long-term use of hormone therapy may have some cardiovascular and breast cancer risks, but starting hormones around the time of menopause has shown benefits for some women. Talk to your doctor about the benefits and risks of hormone therapy and whether it's a safe choice for you.   Vaginal estrogen. To relieve vaginal dryness, estrogen can be administered directly to the vagina using a vaginal cream, tablet or ring. This treatment releases just a small amount of estrogen, which is absorbed by the vaginal tissues. It  can help relieve vaginal dryness, discomfort with intercourse and some urinary symptoms.   Low-dose antidepressants. Certain antidepressants related to the class of drugs called selective serotonin reuptake inhibitors (SSRIs) may decrease menopausal hot flashes. A low-dose antidepressant for management of hot flashes may be useful for women who can't take estrogen for health reasons or for women who need an antidepressant for a mood disorder.   Gabapentin (Gralise, Horizant, Neurontin). Gabapentin is approved to treat seizures, but it has also been shown to help reduce hot flashes. This drug is useful in women who can't use estrogen therapy and in those who also have nighttime hot flashes.   Clonidine (Catapres, Kapvay). Clonidine, a pill or patch typically used to treat high blood pressure, might provide some relief from hot flashes.   Medications to prevent or treat osteoporosis. Depending on individual needs, doctors may recommend medication to prevent or treat osteoporosis. Several medications are available that help reduce bone loss and risk of fractures. Your doctor might prescribe vitamin D supplements to help strengthen bones.    Before deciding on any form of treatment, talk with your doctor about your options and the risks and benefits involved with each. Review your options yearly, as your needs and treatment options may change.    Lifestyle and home remedies  Fortunately, many of the signs and symptoms associated with menopause are temporary. Take these steps to help reduce or prevent their effects:  Cool hot flashes. Dress in layers, have a cold glass of water or go somewhere cooler. Try to pinpoint what triggers your hot flashes. For many women, triggers may include hot beverages, caffeine, spicy foods, alcohol, stress, hot weather and even a warm room.   Decrease vaginal discomfort. Try an over-the-counter, water-based vaginal lubricant (Astroglide, K-Y jelly, Sliquid, others) or a silicone-based  lubricant or moisturizer (Replens, K-Y Liquibeads, Sliquid, others).  You might consider choosing a product that doesn't contain glycerin, which can cause burning or irritation if you're sensitive to that chemical. Staying sexually active also helps with vaginal discomfort by increasing blood flow to the vagina.  Get enough sleep. Avoid caffeine, which can make it hard to get to sleep, and avoid drinking too much alcohol, which can interrupt sleep. Exercise during the day, although not right before bedtime. If hot flashes disturb your sleep, you may need to find a way to manage them before you can get adequate rest.   Practice relaxation techniques. Techniques such as deep breathing, paced breathing, guided imagery, massage and progressive muscle relaxation may help with menopausal symptoms. You can find a number of books and online offerings that show different relaxation exercises.   Strengthen your pelvic floor. Pelvic floor muscle exercises, called Kegel exercises, can improve some forms of urinary incontinence.   Eat a balanced diet. Include a variety of fruits, vegetables and whole grains. Limit saturated fats, oils and sugars. Ask your provider if you need calcium or vitamin D supplements to help meet daily requirements.   Don't smoke. Smoking increases your risk of heart disease, stroke, osteoporosis, cancer and a range of other health problems. It may also increase hot flashes and bring on earlier menopause.   Exercise regularly. Get regular physical activity or exercise on most days to help protect against heart disease, diabetes, osteoporosis and other conditions associated with aging.    Alternative medicine  Many approaches have been promoted as aids in managing the symptoms of menopause, but few of them have scientific evidence to back up the claims. Some complementary and alternative treatments that have been or are being studied include:  Plant estrogens (phytoestrogens). These estrogens occur  "naturally in certain foods. There are two main types of phytoestrogens -- isoflavones and lignans. Isoflavones are found in soybeans, lentils, chickpeas and other legumes. Lignans occur in flaxseed, whole grains, and some fruits and vegetables.  Whether the estrogens in these foods can relieve hot flashes and other menopausal symptoms remains to be proved, but most studies have found them ineffective. Isoflavones have some weak estrogen-like effects, so if you've had breast cancer, talk to your doctor before supplementing your diet with isoflavone pills.  The herb samantha is thought to contain compounds with estrogen-like effects, and there's good evidence that it can effectively manage menopause symptoms. The herb and its oils should be avoided in people who have an allergy to samantha, and in pregnant or breast-feeding women. Use carefully in people with high blood pressure or epilepsy.  Bioidentical hormones. These hormones come from plant sources. The term \"bioidentical\" implies the hormones in the product are chemically identical to those your body produces. There are some commercially available bioidentical hormones approved by the Food and Drug Administration (FDA). But many preparations are compounded -- mixed in a pharmacy according to a doctor's prescription -- and aren't regulated by the FDA, so quality and risks could vary. There's no scientific evidence that bioidentical hormones work any better than traditional hormone therapy in easing menopause symptoms. There's also no evidence that they're any less risky than traditional hormone therapy.   Black cohosh. Black cohosh has been popular among many women with menopausal symptoms. But there's little evidence that black cohosh is effective, and the supplement can be harmful to the liver and may be unsafe for women with a history of breast cancer.   Yoga. There's no evidence to support the practice of yoga in reducing menopausal symptoms. But balance exercises " such as yoga or carol chi can improve strength and coordination and may help prevent falls that could lead to broken bones. Check with your doctor before starting balance exercises. Consider taking a class to learn how to perform postures and proper breathing techniques.   Acupuncture. Acupuncture may have some temporary benefit in helping to reduce hot flashes, but research hasn't shown significant or consistent improvements. More research is needed.   Hypnosis. Hypnotherapy may decrease the incidence of hot flashes for some menopausal women, according to research from the [employer] [facility] [employer] [employer] at the [place] [employer]. Hypnotherapy also helped improve sleep and decreased interference in daily life, according to the study.    You may have heard of or tried other dietary supplements, such as red clover, kava, dong quai, DHEA, evening primrose oil and wild yam (natural progesterone cream). Scientific evidence on effectiveness is lacking, and some of these products may be harmful.    Talk with your doctor before taking any herbal or dietary supplements for menopausal symptoms. The FDA does not regulate herbal products, and some can be dangerous or interact with other medications you take, putting your health at risk.

## 2024-04-10 NOTE — RESULT ENCOUNTER NOTE
Called patient to review, no answer, LVM.    My note states pap was not collected so I suspect an empty specimen cup was sent accidentally. I called oT to confirm with her that a pap was not collected, but there was no answer. Can someone please try her again and if she wants pap collected, should return to office for repeat collection. If this was sent accidentally, can we confirm with lab that it was sent in error so she doesn't get billed?     Thanks

## 2024-04-11 ENCOUNTER — TELEPHONE (OUTPATIENT)
Age: 59
End: 2024-04-11

## 2024-04-11 NOTE — TELEPHONE ENCOUNTER
Tried calling pt and lmom, okay per Medical Communication Consent, to let her know I spoke to the lab and she will not be billed for the pap. HPV testing has been requested to be canceled and she should not get billed for this test as well.

## 2024-04-11 NOTE — TELEPHONE ENCOUNTER
Pt missed call and called back she said that  she id not have pap done that  the  said she did not need it and does not want to be billed  for it

## 2024-04-12 LAB
CLINICAL INFO: NORMAL
DATE PREVIOUS BX: NORMAL
HPV E6+E7 MRNA CVX QL NAA+PROBE: NORMAL
LMP START DATE: NORMAL
SL AMB PREV. PAP:: NORMAL
SPECIMEN SOURCE CVX/VAG CYTO: NORMAL

## 2024-06-03 ENCOUNTER — PREP FOR PROCEDURE (OUTPATIENT)
Age: 59
End: 2024-06-03

## 2024-06-03 ENCOUNTER — TELEPHONE (OUTPATIENT)
Age: 59
End: 2024-06-03

## 2024-06-03 DIAGNOSIS — Z86.010 HISTORY OF COLON POLYPS: Primary | ICD-10-CM

## 2024-06-03 NOTE — TELEPHONE ENCOUNTER
06/03/24  Screened by: Isela Samuels    Referring Provider Dr. Sebastian    Pre- Screening:     There is no height or weight on file to calculate BMI.34.58  Has patient been referred for a routine screening Colonoscopy? yes  Is the patient between 45-75 years old? yes      Previous Colonoscopy yes   If yes:    Date: 5 yrs ago    Facility:     Reason:       Does the patient want to see a Gastroenterologist prior to their procedure OR are they having any GI symptoms? no    Has the patient been hospitalized or had abdominal surgery in the past 6 months? no    Does the patient use supplemental oxygen? no    Does the patient take Coumadin, Lovenox, Plavix, Elliquis, Xarelto, or other blood thinning medication? no    Has the patient had a stroke, cardiac event, or stent placed in the past year? no      If patient is between 45yrs - 49yrs, please advise patient that we will have to confirm benefits & coverage with their insurance company for a routine screening colonoscopy.

## 2024-06-03 NOTE — TELEPHONE ENCOUNTER
Scheduled date of colonoscopy (as of today): 7/17/24    Physician performing colonoscopy: Dr. Gill    Location of colonoscopy: [facility]

## 2024-07-02 ENCOUNTER — TELEPHONE (OUTPATIENT)
Dept: GASTROENTEROLOGY | Facility: CLINIC | Age: 59
End: 2024-07-02

## 2024-07-03 ENCOUNTER — ANESTHESIA (OUTPATIENT)
Dept: ANESTHESIOLOGY | Facility: HOSPITAL | Age: 59
End: 2024-07-03

## 2024-07-03 ENCOUNTER — ANESTHESIA EVENT (OUTPATIENT)
Dept: ANESTHESIOLOGY | Facility: HOSPITAL | Age: 59
End: 2024-07-03

## 2024-07-17 ENCOUNTER — ANESTHESIA EVENT (OUTPATIENT)
Dept: GASTROENTEROLOGY | Facility: AMBULARY SURGERY CENTER | Age: 59
End: 2024-07-17

## 2024-07-17 ENCOUNTER — ANESTHESIA (OUTPATIENT)
Dept: GASTROENTEROLOGY | Facility: AMBULARY SURGERY CENTER | Age: 59
End: 2024-07-17

## 2024-07-17 ENCOUNTER — HOSPITAL ENCOUNTER (OUTPATIENT)
Dept: GASTROENTEROLOGY | Facility: AMBULARY SURGERY CENTER | Age: 59
Setting detail: OUTPATIENT SURGERY
Discharge: HOME | End: 2024-07-17
Attending: INTERNAL MEDICINE | Admitting: INTERNAL MEDICINE
Payer: COMMERCIAL

## 2024-07-17 VITALS
SYSTOLIC BLOOD PRESSURE: 115 MMHG | TEMPERATURE: 97.2 F | HEART RATE: 77 BPM | RESPIRATION RATE: 18 BRPM | DIASTOLIC BLOOD PRESSURE: 58 MMHG | OXYGEN SATURATION: 97 % | BODY MASS INDEX: 32.85 KG/M2 | HEIGHT: 61 IN | WEIGHT: 174 LBS

## 2024-07-17 DIAGNOSIS — Z86.010 HISTORY OF COLON POLYPS: ICD-10-CM

## 2024-07-17 PROCEDURE — 88305 TISSUE EXAM BY PATHOLOGIST: CPT | Performed by: PATHOLOGY

## 2024-07-17 PROCEDURE — 45380 COLONOSCOPY AND BIOPSY: CPT | Performed by: INTERNAL MEDICINE

## 2024-07-17 RX ORDER — SODIUM CHLORIDE, SODIUM LACTATE, POTASSIUM CHLORIDE, CALCIUM CHLORIDE 600; 310; 30; 20 MG/100ML; MG/100ML; MG/100ML; MG/100ML
INJECTION, SOLUTION INTRAVENOUS CONTINUOUS PRN
Status: DISCONTINUED | OUTPATIENT
Start: 2024-07-17 | End: 2024-07-17

## 2024-07-17 RX ORDER — PROPOFOL 10 MG/ML
INJECTION, EMULSION INTRAVENOUS AS NEEDED
Status: DISCONTINUED | OUTPATIENT
Start: 2024-07-17 | End: 2024-07-17

## 2024-07-17 RX ADMIN — PROPOFOL 50 MG: 10 INJECTION, EMULSION INTRAVENOUS at 11:04

## 2024-07-17 RX ADMIN — PROPOFOL 70 MG: 10 INJECTION, EMULSION INTRAVENOUS at 11:00

## 2024-07-17 RX ADMIN — SODIUM CHLORIDE, SODIUM LACTATE, POTASSIUM CHLORIDE, AND CALCIUM CHLORIDE: .6; .31; .03; .02 INJECTION, SOLUTION INTRAVENOUS at 10:46

## 2024-07-17 RX ADMIN — PROPOFOL 70 MG: 10 INJECTION, EMULSION INTRAVENOUS at 10:58

## 2024-07-17 RX ADMIN — PROPOFOL 130 MG: 10 INJECTION, EMULSION INTRAVENOUS at 10:56

## 2024-07-17 RX ADMIN — PROPOFOL 30 MG: 10 INJECTION, EMULSION INTRAVENOUS at 11:02

## 2024-07-17 RX ADMIN — PROPOFOL 50 MG: 10 INJECTION, EMULSION INTRAVENOUS at 11:07

## 2024-07-17 NOTE — ANESTHESIA PREPROCEDURE EVALUATION
Procedure:  COLONOSCOPY    Relevant Problems   CARDIO   (+) Hyperlipemia        Physical Exam    Airway    Mallampati score: II  TM Distance: >3 FB  Neck ROM: full     Dental   No notable dental hx     Cardiovascular  Rhythm: regular, Rate: normal    Pulmonary   Breath sounds clear to auscultation    Other Findings  post-pubertal.      Anesthesia Plan  ASA Score- 2     Anesthesia Type- IV sedation with anesthesia with ASA Monitors.         Additional Monitors:     Airway Plan:            Plan Factors-Exercise tolerance (METS): >4 METS.    Chart reviewed.   Existing labs reviewed. Patient summary reviewed.    Patient is not a current smoker.              Induction- intravenous.    Postoperative Plan-         Informed Consent- Anesthetic plan and risks discussed with patient.  I personally reviewed this patient with the CRNA. Discussed and agreed on the Anesthesia Plan with the CRNA..

## 2024-07-17 NOTE — H&P
"History and Physical -  [facility]  Tami Rucker 59 y.o. female MRN: [id number]    HPI: Tami Rucker is a 59 y.o. year old female who presents for screening colonoscopy. Last colonoscopy 5 years ago w 1 cecal polyp + thrombosed hemorrhoids + diverticulosis.     Last Hg   Lab Results   Component Value Date    HGB 13.4 09/05/2019     Last INR   Lab Results   Component Value Date    INR 0.96 09/05/2019     Last Platelets   Lab Results   Component Value Date     09/05/2019       Review of Systems    Historical Information   Past Medical History:   Diagnosis Date    Hyperlipidemia      Past Surgical History:   Procedure Laterality Date    US GUIDED MSK PROCEDURE  2/25/2020     Social History   Social History     Substance and Sexual Activity   Alcohol Use Not Currently     Social History     Substance and Sexual Activity   Drug Use No     Social History     Tobacco Use   Smoking Status Never   Smokeless Tobacco Never     Family History   Problem Relation Age of Onset    Crohn's disease Mother     No Known Problems Father     Breast cancer Sister 50    Factor V Leiden deficiency Sister     Diabetes Sister     Colonic polyp Sister     Diabetes Sister     Factor V Leiden deficiency Sister     No Known Problems Maternal Grandmother     Cancer Maternal Grandfather         unsure-possibly stomach    No Known Problems Paternal Grandmother     No Known Problems Paternal Grandfather     No Known Problems Maternal Aunt     No Known Problems Paternal Aunt     No Known Problems Paternal Aunt     Heart disease Family        Meds/Allergies     Not in a hospital admission.    No Known Allergies    Objective     /69   Pulse 87   Temp (!) 97 °F (36.1 °C) (Temporal)   Resp 18   Ht 5' 1\" (1.549 m)   Wt 78.9 kg (174 lb)   LMP 08/01/2023 (Approximate)   SpO2 95%   BMI 32.88 kg/m²     PRIOR GI PROCEDURES      PHYSICAL EXAM    Gen: NAD  CV: RRR  CHEST: Clear  ABD: soft, NT/ND  EXT: " no edema  Neuro: AAO    ASSESSMENT/PLAN:  This is a 59 y.o. year old female here for colonoscopy    Plan/Procedure: colonoscopy

## 2024-07-17 NOTE — ANESTHESIA POSTPROCEDURE EVALUATION
Post-Op Assessment Note    CV Status:  Stable  Pain Score: 0    Pain management: adequate       Mental Status:  Alert and awake   Hydration Status:  Euvolemic   PONV Controlled:  Controlled   Airway Patency:  Patent     Post Op Vitals Reviewed: Yes    No anethesia notable event occurred.    Staff: CRNA               /61   Temp    Pulse 76   Resp 18   SpO2 97

## 2024-07-25 ENCOUNTER — HOSPITAL ENCOUNTER (OUTPATIENT)
Dept: ULTRASOUND IMAGING | Facility: CLINIC | Age: 59
Discharge: HOME | End: 2024-07-25
Payer: COMMERCIAL

## 2024-07-25 ENCOUNTER — HOSPITAL ENCOUNTER (OUTPATIENT)
Dept: MAMMOGRAPHY | Facility: CLINIC | Age: 59
Discharge: HOME | End: 2024-07-25
Payer: COMMERCIAL

## 2024-07-25 DIAGNOSIS — N63.21 BREAST LUMP ON LEFT SIDE AT 2 O'CLOCK POSITION: ICD-10-CM

## 2024-07-25 DIAGNOSIS — N63.21 BREAST LUMP ON LEFT SIDE AT 1 O'CLOCK POSITION: ICD-10-CM

## 2024-07-25 PROCEDURE — G0279 TOMOSYNTHESIS, MAMMO: HCPCS

## 2024-07-25 PROCEDURE — 76642 ULTRASOUND BREAST LIMITED: CPT

## 2024-07-25 PROCEDURE — 77065 DX MAMMO INCL CAD UNI: CPT

## 2025-03-27 ENCOUNTER — HOSPITAL ENCOUNTER (OUTPATIENT)
Dept: RADIOLOGY | Age: 60
Discharge: HOME | End: 2025-03-27
Payer: COMMERCIAL

## 2025-03-27 DIAGNOSIS — Z12.31 ENCOUNTER FOR SCREENING MAMMOGRAM FOR MALIGNANT NEOPLASM OF BREAST: ICD-10-CM

## 2025-03-27 PROCEDURE — 77067 SCR MAMMO BI INCL CAD: CPT

## 2025-03-27 PROCEDURE — 77063 BREAST TOMOSYNTHESIS BI: CPT

## 2025-03-31 ENCOUNTER — RESULTS FOLLOW-UP (OUTPATIENT)
Dept: OBGYN CLINIC | Facility: CLINIC | Age: 60
End: 2025-03-31